# Patient Record
Sex: FEMALE | Race: WHITE | NOT HISPANIC OR LATINO
[De-identification: names, ages, dates, MRNs, and addresses within clinical notes are randomized per-mention and may not be internally consistent; named-entity substitution may affect disease eponyms.]

---

## 2017-03-09 ENCOUNTER — RESULT REVIEW (OUTPATIENT)
Age: 47
End: 2017-03-09

## 2018-04-11 ENCOUNTER — RESULT REVIEW (OUTPATIENT)
Age: 48
End: 2018-04-11

## 2020-04-25 ENCOUNTER — EMERGENCY (EMERGENCY)
Facility: HOSPITAL | Age: 50
LOS: 1 days | Discharge: HOME | End: 2020-04-25
Admitting: EMERGENCY MEDICINE
Payer: COMMERCIAL

## 2020-04-25 VITALS
TEMPERATURE: 98 F | HEART RATE: 91 BPM | RESPIRATION RATE: 17 BRPM | DIASTOLIC BLOOD PRESSURE: 77 MMHG | OXYGEN SATURATION: 98 % | SYSTOLIC BLOOD PRESSURE: 142 MMHG

## 2020-04-25 VITALS
WEIGHT: 214.95 LBS | HEIGHT: 63 IN | TEMPERATURE: 99 F | RESPIRATION RATE: 18 BRPM | DIASTOLIC BLOOD PRESSURE: 94 MMHG | HEART RATE: 107 BPM | SYSTOLIC BLOOD PRESSURE: 149 MMHG | OXYGEN SATURATION: 99 %

## 2020-04-25 LAB
ALBUMIN SERPL ELPH-MCNC: 3.7 G/DL — SIGNIFICANT CHANGE UP (ref 3.4–5)
ALP SERPL-CCNC: 81 U/L — SIGNIFICANT CHANGE UP (ref 40–120)
ALT FLD-CCNC: 25 U/L — SIGNIFICANT CHANGE UP (ref 12–42)
ANION GAP SERPL CALC-SCNC: 9 MMOL/L — SIGNIFICANT CHANGE UP (ref 9–16)
APPEARANCE UR: CLEAR — SIGNIFICANT CHANGE UP
AST SERPL-CCNC: 22 U/L — SIGNIFICANT CHANGE UP (ref 15–37)
BASOPHILS # BLD AUTO: 0.06 K/UL — SIGNIFICANT CHANGE UP (ref 0–0.2)
BASOPHILS NFR BLD AUTO: 0.4 % — SIGNIFICANT CHANGE UP (ref 0–2)
BILIRUB SERPL-MCNC: 1.9 MG/DL — HIGH (ref 0.2–1.2)
BILIRUB UR-MCNC: NEGATIVE — SIGNIFICANT CHANGE UP
BUN SERPL-MCNC: 9 MG/DL — SIGNIFICANT CHANGE UP (ref 7–23)
CALCIUM SERPL-MCNC: 9.3 MG/DL — SIGNIFICANT CHANGE UP (ref 8.5–10.5)
CHLORIDE SERPL-SCNC: 103 MMOL/L — SIGNIFICANT CHANGE UP (ref 96–108)
CO2 SERPL-SCNC: 25 MMOL/L — SIGNIFICANT CHANGE UP (ref 22–31)
COLOR SPEC: YELLOW — SIGNIFICANT CHANGE UP
CREAT SERPL-MCNC: 0.81 MG/DL — SIGNIFICANT CHANGE UP (ref 0.5–1.3)
DIFF PNL FLD: ABNORMAL
EOSINOPHIL # BLD AUTO: 0.02 K/UL — SIGNIFICANT CHANGE UP (ref 0–0.5)
EOSINOPHIL NFR BLD AUTO: 0.1 % — SIGNIFICANT CHANGE UP (ref 0–6)
GLUCOSE SERPL-MCNC: 128 MG/DL — HIGH (ref 70–99)
GLUCOSE UR QL: NEGATIVE — SIGNIFICANT CHANGE UP
HCT VFR BLD CALC: 40.1 % — SIGNIFICANT CHANGE UP (ref 34.5–45)
HGB BLD-MCNC: 13.8 G/DL — SIGNIFICANT CHANGE UP (ref 11.5–15.5)
IMM GRANULOCYTES NFR BLD AUTO: 0.6 % — SIGNIFICANT CHANGE UP (ref 0–1.5)
INR BLD: 1.17 — HIGH (ref 0.88–1.16)
KETONES UR-MCNC: NEGATIVE — SIGNIFICANT CHANGE UP
LEUKOCYTE ESTERASE UR-ACNC: NEGATIVE — SIGNIFICANT CHANGE UP
LIDOCAIN IGE QN: 54 U/L — LOW (ref 73–393)
LYMPHOCYTES # BLD AUTO: 1.53 K/UL — SIGNIFICANT CHANGE UP (ref 1–3.3)
LYMPHOCYTES # BLD AUTO: 9 % — LOW (ref 13–44)
MCHC RBC-ENTMCNC: 30.4 PG — SIGNIFICANT CHANGE UP (ref 27–34)
MCHC RBC-ENTMCNC: 34.4 GM/DL — SIGNIFICANT CHANGE UP (ref 32–36)
MCV RBC AUTO: 88.3 FL — SIGNIFICANT CHANGE UP (ref 80–100)
MONOCYTES # BLD AUTO: 0.74 K/UL — SIGNIFICANT CHANGE UP (ref 0–0.9)
MONOCYTES NFR BLD AUTO: 4.4 % — SIGNIFICANT CHANGE UP (ref 2–14)
NEUTROPHILS # BLD AUTO: 14.51 K/UL — HIGH (ref 1.8–7.4)
NEUTROPHILS NFR BLD AUTO: 85.5 % — HIGH (ref 43–77)
NITRITE UR-MCNC: NEGATIVE — SIGNIFICANT CHANGE UP
NRBC # BLD: 0 /100 WBCS — SIGNIFICANT CHANGE UP (ref 0–0)
PH UR: 6 — SIGNIFICANT CHANGE UP (ref 5–8)
PLATELET # BLD AUTO: 171 K/UL — SIGNIFICANT CHANGE UP (ref 150–400)
POTASSIUM SERPL-MCNC: 4 MMOL/L — SIGNIFICANT CHANGE UP (ref 3.5–5.3)
POTASSIUM SERPL-SCNC: 4 MMOL/L — SIGNIFICANT CHANGE UP (ref 3.5–5.3)
PROT SERPL-MCNC: 7.7 G/DL — SIGNIFICANT CHANGE UP (ref 6.4–8.2)
PROT UR-MCNC: NEGATIVE MG/DL — SIGNIFICANT CHANGE UP
PROTHROM AB SERPL-ACNC: 12.9 SEC — SIGNIFICANT CHANGE UP (ref 10–12.9)
RBC # BLD: 4.54 M/UL — SIGNIFICANT CHANGE UP (ref 3.8–5.2)
RBC # FLD: 13.6 % — SIGNIFICANT CHANGE UP (ref 10.3–14.5)
SODIUM SERPL-SCNC: 137 MMOL/L — SIGNIFICANT CHANGE UP (ref 132–145)
SP GR SPEC: <=1.005 — SIGNIFICANT CHANGE UP (ref 1–1.03)
UROBILINOGEN FLD QL: 0.2 E.U./DL — SIGNIFICANT CHANGE UP
WBC # BLD: 16.96 K/UL — HIGH (ref 3.8–10.5)
WBC # FLD AUTO: 16.96 K/UL — HIGH (ref 3.8–10.5)

## 2020-04-25 PROCEDURE — 74177 CT ABD & PELVIS W/CONTRAST: CPT | Mod: 26

## 2020-04-25 PROCEDURE — 99285 EMERGENCY DEPT VISIT HI MDM: CPT

## 2020-04-25 RX ORDER — CIPROFLOXACIN LACTATE 400MG/40ML
400 VIAL (ML) INTRAVENOUS ONCE
Refills: 0 | Status: COMPLETED | OUTPATIENT
Start: 2020-04-25 | End: 2020-04-25

## 2020-04-25 RX ORDER — MOXIFLOXACIN HYDROCHLORIDE TABLETS, 400 MG 400 MG/1
1 TABLET, FILM COATED ORAL
Qty: 20 | Refills: 0
Start: 2020-04-25 | End: 2020-05-04

## 2020-04-25 RX ORDER — IOHEXOL 300 MG/ML
30 INJECTION, SOLUTION INTRAVENOUS ONCE
Refills: 0 | Status: COMPLETED | OUTPATIENT
Start: 2020-04-25 | End: 2020-04-25

## 2020-04-25 RX ORDER — SODIUM CHLORIDE 9 MG/ML
1000 INJECTION INTRAMUSCULAR; INTRAVENOUS; SUBCUTANEOUS ONCE
Refills: 0 | Status: COMPLETED | OUTPATIENT
Start: 2020-04-25 | End: 2020-04-25

## 2020-04-25 RX ORDER — ACETAMINOPHEN 500 MG
975 TABLET ORAL ONCE
Refills: 0 | Status: COMPLETED | OUTPATIENT
Start: 2020-04-25 | End: 2020-04-25

## 2020-04-25 RX ORDER — METRONIDAZOLE 500 MG
500 TABLET ORAL ONCE
Refills: 0 | Status: COMPLETED | OUTPATIENT
Start: 2020-04-25 | End: 2020-04-25

## 2020-04-25 RX ORDER — METRONIDAZOLE 500 MG
1 TABLET ORAL
Qty: 21 | Refills: 0
Start: 2020-04-25 | End: 2020-05-01

## 2020-04-25 RX ADMIN — SODIUM CHLORIDE 1000 MILLILITER(S): 9 INJECTION INTRAMUSCULAR; INTRAVENOUS; SUBCUTANEOUS at 14:14

## 2020-04-25 RX ADMIN — Medication 100 MILLIGRAM(S): at 14:14

## 2020-04-25 RX ADMIN — SODIUM CHLORIDE 1000 MILLILITER(S): 9 INJECTION INTRAMUSCULAR; INTRAVENOUS; SUBCUTANEOUS at 11:30

## 2020-04-25 RX ADMIN — IOHEXOL 30 MILLILITER(S): 300 INJECTION, SOLUTION INTRAVENOUS at 11:30

## 2020-04-25 RX ADMIN — Medication 200 MILLIGRAM(S): at 15:06

## 2020-04-25 RX ADMIN — Medication 975 MILLIGRAM(S): at 11:30

## 2020-04-25 NOTE — ED PROVIDER NOTE - CLINICAL SUMMARY MEDICAL DECISION MAKING FREE TEXT BOX
Pt. with PMH HTN, migraines, c/o lower abd cramping pain X since yesterday morning. + large Soft BM ( NB) yesterday with some relief of pain. decrease appetite, + fever 100 took tylenol last 5 pm.  well appearing, hrt rate 107, temp 99, charisse do labs, Ct r/o acute abd etiology, pt. refused pain meds.

## 2020-04-25 NOTE — ED PROVIDER NOTE - NSFOLLOWUPINSTRUCTIONS_ED_ALL_ED_FT
Diverticulitis     Diverticulitis is when small pockets in your large intestine (colon) get infected or swollen. This causes stomach pain and watery poop (diarrhea).  These pouches are called diverticula. They form in people who have a condition called diverticulosis.  Follow these instructions at home:  Medicines     Take over-the-counter and prescription medicines only as told by your doctor. These include:  Antibiotics.Pain medicines.Fiber pills.Probiotics.Stool softeners.Do not drive or use heavy machinery while taking prescription pain medicine.If you were prescribed an antibiotic, take it as told. Do not stop taking it even if you feel better.General instructions        Follow a diet as told by your doctor.When you feel better, your doctor may tell you to change your diet. You may need to eat a lot of fiber. Fiber makes it easier to poop (have bowel movements). Healthy foods with fiber include:  Berries.Beans.Lentils.Green vegetables.Exercise 3 or more times a week. Aim for 30 minutes each time. Exercise enough to sweat and make your heart beat faster.Keep all follow-up visits as told. This is important. You may need to have an exam of the large intestine. This is called a colonoscopy.Contact a doctor if:  Your pain does not get better.You have a hard time eating or drinking.You are not pooping like normal.Get help right away if:  Your pain gets worse.Your problems do not get better.Your problems get worse very fast.You have a fever.You throw up (vomit) more than one time.You have poop that is:  Bloody.Black.Tarry.Summary  Diverticulitis is when small pockets in your large intestine (colon) get infected or swollen.Take medicines only as told by your doctor.Follow a diet as told by your doctor.This information is not intended to replace advice given to you by your health care provider. Make sure you discuss any questions you have with your health care provider.

## 2020-04-25 NOTE — ED PROVIDER NOTE - OBJECTIVE STATEMENT
Pt. with PMH HTN, migraines, c/o lower abd cramping pain X since yesterday morning. + large Soft BM ( NB) yesterday with some relief of pain. decrease appetite, + fever 100 took tylenol last 5 pm.  also c/o urinary urgency. pt. called PMD sent to ER for further evaluation. Pt. otherwise denies any N/v, no dysuria, no recent travel, no recent abx use, no sick contact. no known covid exposure.

## 2020-04-25 NOTE — ED PROVIDER NOTE - PATIENT PORTAL LINK FT
You can access the FollowMyHealth Patient Portal offered by [facility] by registering at the following website: [email or web address]. By joining [employer]’s FollowMyHealth portal, you will also be able to view your health information using other applications (apps) compatible with our system.

## 2020-04-25 NOTE — ED ADULT TRIAGE NOTE - CHIEF COMPLAINT QUOTE
Patient to ED with complaint of lower abdominal pain X 1 day with diarrhea and nausea.  Currently on menstrual cycle.  No acute distress

## 2020-04-25 NOTE — ED PROVIDER NOTE - GASTROINTESTINAL, MLM
mild diffuse suprapubic tenderness, otherwise Abdomen soft, no guarding, no rebound, no CVa tenderness, no RUQ pain

## 2020-04-29 DIAGNOSIS — R19.7 DIARRHEA, UNSPECIFIED: ICD-10-CM

## 2020-04-29 DIAGNOSIS — R50.9 FEVER, UNSPECIFIED: ICD-10-CM

## 2020-04-29 DIAGNOSIS — R10.30 LOWER ABDOMINAL PAIN, UNSPECIFIED: ICD-10-CM

## 2020-09-24 ENCOUNTER — EMERGENCY (EMERGENCY)
Facility: HOSPITAL | Age: 50
LOS: 1 days | Discharge: HOME | End: 2020-09-24
Attending: EMERGENCY MEDICINE | Admitting: EMERGENCY MEDICINE
Payer: COMMERCIAL

## 2020-09-24 VITALS
TEMPERATURE: 98 F | RESPIRATION RATE: 18 BRPM | OXYGEN SATURATION: 99 % | DIASTOLIC BLOOD PRESSURE: 83 MMHG | HEART RATE: 60 BPM | SYSTOLIC BLOOD PRESSURE: 165 MMHG

## 2020-09-24 VITALS
OXYGEN SATURATION: 97 % | HEART RATE: 70 BPM | WEIGHT: 220.02 LBS | DIASTOLIC BLOOD PRESSURE: 117 MMHG | SYSTOLIC BLOOD PRESSURE: 167 MMHG | RESPIRATION RATE: 15 BRPM | HEIGHT: 63 IN | TEMPERATURE: 98 F

## 2020-09-24 LAB
ALBUMIN SERPL ELPH-MCNC: 3.9 G/DL — SIGNIFICANT CHANGE UP (ref 3.4–5)
ALP SERPL-CCNC: 98 U/L — SIGNIFICANT CHANGE UP (ref 40–120)
ALT FLD-CCNC: 31 U/L — SIGNIFICANT CHANGE UP (ref 12–42)
ANION GAP SERPL CALC-SCNC: 9 MMOL/L — SIGNIFICANT CHANGE UP (ref 9–16)
APPEARANCE UR: CLEAR — SIGNIFICANT CHANGE UP
APTT BLD: 31.5 SEC — SIGNIFICANT CHANGE UP (ref 27.5–35.5)
AST SERPL-CCNC: 21 U/L — SIGNIFICANT CHANGE UP (ref 15–37)
BACTERIA # UR AUTO: PRESENT /HPF
BASOPHILS # BLD AUTO: 0.06 K/UL — SIGNIFICANT CHANGE UP (ref 0–0.2)
BASOPHILS NFR BLD AUTO: 0.6 % — SIGNIFICANT CHANGE UP (ref 0–2)
BILIRUB SERPL-MCNC: 0.5 MG/DL — SIGNIFICANT CHANGE UP (ref 0.2–1.2)
BILIRUB UR-MCNC: NEGATIVE — SIGNIFICANT CHANGE UP
BUN SERPL-MCNC: 15 MG/DL — SIGNIFICANT CHANGE UP (ref 7–23)
CALCIUM SERPL-MCNC: 9.3 MG/DL — SIGNIFICANT CHANGE UP (ref 8.5–10.5)
CHLORIDE SERPL-SCNC: 104 MMOL/L — SIGNIFICANT CHANGE UP (ref 96–108)
CO2 SERPL-SCNC: 26 MMOL/L — SIGNIFICANT CHANGE UP (ref 22–31)
COLOR SPEC: YELLOW — SIGNIFICANT CHANGE UP
CREAT SERPL-MCNC: 0.75 MG/DL — SIGNIFICANT CHANGE UP (ref 0.5–1.3)
DIFF PNL FLD: ABNORMAL
EOSINOPHIL # BLD AUTO: 0.2 K/UL — SIGNIFICANT CHANGE UP (ref 0–0.5)
EOSINOPHIL NFR BLD AUTO: 2 % — SIGNIFICANT CHANGE UP (ref 0–6)
EPI CELLS # UR: SIGNIFICANT CHANGE UP /HPF (ref 0–5)
GLUCOSE SERPL-MCNC: 110 MG/DL — HIGH (ref 70–99)
GLUCOSE UR QL: NEGATIVE — SIGNIFICANT CHANGE UP
HCT VFR BLD CALC: 40.5 % — SIGNIFICANT CHANGE UP (ref 34.5–45)
HGB BLD-MCNC: 13.8 G/DL — SIGNIFICANT CHANGE UP (ref 11.5–15.5)
IMM GRANULOCYTES NFR BLD AUTO: 0.4 % — SIGNIFICANT CHANGE UP (ref 0–1.5)
INR BLD: 0.88 — SIGNIFICANT CHANGE UP (ref 0.88–1.16)
KETONES UR-MCNC: NEGATIVE — SIGNIFICANT CHANGE UP
LEUKOCYTE ESTERASE UR-ACNC: NEGATIVE — SIGNIFICANT CHANGE UP
LYMPHOCYTES # BLD AUTO: 2.85 K/UL — SIGNIFICANT CHANGE UP (ref 1–3.3)
LYMPHOCYTES # BLD AUTO: 28.3 % — SIGNIFICANT CHANGE UP (ref 13–44)
MCHC RBC-ENTMCNC: 30.4 PG — SIGNIFICANT CHANGE UP (ref 27–34)
MCHC RBC-ENTMCNC: 34.1 GM/DL — SIGNIFICANT CHANGE UP (ref 32–36)
MCV RBC AUTO: 89.2 FL — SIGNIFICANT CHANGE UP (ref 80–100)
MONOCYTES # BLD AUTO: 0.51 K/UL — SIGNIFICANT CHANGE UP (ref 0–0.9)
MONOCYTES NFR BLD AUTO: 5.1 % — SIGNIFICANT CHANGE UP (ref 2–14)
NEUTROPHILS # BLD AUTO: 6.4 K/UL — SIGNIFICANT CHANGE UP (ref 1.8–7.4)
NEUTROPHILS NFR BLD AUTO: 63.6 % — SIGNIFICANT CHANGE UP (ref 43–77)
NITRITE UR-MCNC: NEGATIVE — SIGNIFICANT CHANGE UP
NRBC # BLD: 0 /100 WBCS — SIGNIFICANT CHANGE UP (ref 0–0)
PH UR: 5 — SIGNIFICANT CHANGE UP (ref 5–8)
PLATELET # BLD AUTO: 178 K/UL — SIGNIFICANT CHANGE UP (ref 150–400)
POTASSIUM SERPL-MCNC: 4.4 MMOL/L — SIGNIFICANT CHANGE UP (ref 3.5–5.3)
POTASSIUM SERPL-SCNC: 4.4 MMOL/L — SIGNIFICANT CHANGE UP (ref 3.5–5.3)
PROT SERPL-MCNC: 7.7 G/DL — SIGNIFICANT CHANGE UP (ref 6.4–8.2)
PROT UR-MCNC: NEGATIVE MG/DL — SIGNIFICANT CHANGE UP
PROTHROM AB SERPL-ACNC: 10.6 SEC — SIGNIFICANT CHANGE UP (ref 10.6–13.6)
RBC # BLD: 4.54 M/UL — SIGNIFICANT CHANGE UP (ref 3.8–5.2)
RBC # FLD: 12.7 % — SIGNIFICANT CHANGE UP (ref 10.3–14.5)
RBC CASTS # UR COMP ASSIST: < 5 /HPF — SIGNIFICANT CHANGE UP
SODIUM SERPL-SCNC: 139 MMOL/L — SIGNIFICANT CHANGE UP (ref 132–145)
SP GR SPEC: <=1.005 — SIGNIFICANT CHANGE UP (ref 1–1.03)
TROPONIN I SERPL-MCNC: <0.017 NG/ML — LOW (ref 0.02–0.06)
UROBILINOGEN FLD QL: 0.2 E.U./DL — SIGNIFICANT CHANGE UP
WBC # BLD: 10.06 K/UL — SIGNIFICANT CHANGE UP (ref 3.8–10.5)
WBC # FLD AUTO: 10.06 K/UL — SIGNIFICANT CHANGE UP (ref 3.8–10.5)
WBC UR QL: < 5 /HPF — SIGNIFICANT CHANGE UP

## 2020-09-24 PROCEDURE — 93010 ELECTROCARDIOGRAM REPORT: CPT | Mod: NC

## 2020-09-24 PROCEDURE — 70450 CT HEAD/BRAIN W/O DYE: CPT | Mod: 26,59

## 2020-09-24 PROCEDURE — 99285 EMERGENCY DEPT VISIT HI MDM: CPT

## 2020-09-24 PROCEDURE — 0042T: CPT

## 2020-09-24 PROCEDURE — 70498 CT ANGIOGRAPHY NECK: CPT | Mod: 26

## 2020-09-24 PROCEDURE — 70496 CT ANGIOGRAPHY HEAD: CPT | Mod: 26

## 2020-09-24 RX ORDER — HYDROCHLOROTHIAZIDE 25 MG
25 TABLET ORAL ONCE
Refills: 0 | Status: COMPLETED | OUTPATIENT
Start: 2020-09-24 | End: 2020-09-24

## 2020-09-24 RX ADMIN — Medication 25 MILLIGRAM(S): at 21:44

## 2020-09-24 NOTE — ED PROVIDER NOTE - PATIENT PORTAL LINK FT
You can access the FollowMyHealth Patient Portal offered by [facility] by registering at the following website: [email or web address]. By joining [employer] [facility]’s FollowMyHealth portal, you will also be able to view your health information using other applications (apps) compatible with our system.

## 2020-09-24 NOTE — ED PROVIDER NOTE - CLINICAL SUMMARY MEDICAL DECISION MAKING FREE TEXT BOX
49 y/o F presents to ED c/o R sided tongue and lip numbness.  Pt well appearing.  Stroke protocol initiated upon arrival and later canceled.  Blood pressure is elevated.  Pts BP improved without intervention and symptoms improved.  Pt given dose of HCTZ 25 mg and advised to f/u with PCP to discuss BP management.  Results and diagnosis discussed with patient.  Treatment plan discussed.  Return precautions discussed.  Pt verbalized understanding and given the opportunity to ask questions.  Patient advised to follow up with primary care provider.

## 2020-09-24 NOTE — ED PROVIDER NOTE - NSFOLLOWUPINSTRUCTIONS_ED_ALL_ED_FT
Hypertension    Hypertension, commonly called high blood pressure, is when the force of blood pumping through your arteries is too strong. Hypertension forces your heart to work harder to pump blood. Your arteries may become narrow or stiff. Having untreated or uncontrolled hypertension for a long period of time can cause heart attack, stroke, kidney disease, and other problems. If started on a medication, take exactly as prescribed by your health care professional. Maintain a healthy lifestyle and follow up with your primary care physician.    SEEK IMMEDIATE MEDICAL CARE IF YOU HAVE ANY OF THE FOLLOWING SYMPTOMS: severe headache, confusion, chest pain, abdominal pain, vomiting, or shortness of breath.    FOLLOW UP WITH PRIMARY CARE PROVIDER IN 1-2 DAYS.  CONTINUE TAKING MEDICATIONS AS PRESCRIBED.

## 2020-09-24 NOTE — ED ADULT NURSE REASSESSMENT NOTE - NS ED NURSE REASSESS COMMENT FT1
Assuming responsibility of care from LORENE Robertson, patient returned from CT scan, code stroke cancelled. Patient has a GCS of 15, NIH scale of 0. Patient reports facial numbness is improving. In NAD, hemodynamically stable, on the cardiac monitor, resting in bed, in a cheerful mood. Will continue to monitor.

## 2020-09-24 NOTE — ED ADULT NURSE NOTE - DOES PATIENT HAVE ADVANCE DIRECTIVE
No Hide Include Location In Plan Question?: No Detail Level: Zone Include Location In Plan?: Yes Detail Level: Simple

## 2020-09-24 NOTE — ED PROVIDER NOTE - OBJECTIVE STATEMENT
49 y/o F with PMH of HTN on bystolic, atenolol and other unknown medication.  presents to ED via walkin with c/o R tongue and lip numbness that started 2 hours pta. 51 y/o F with PMH of HTN on Bystolic and metoprolol and other unknown medication.  presents to ED via walkin with c/o R tongue and lip numbness that started 2 hours pta.  Pt states she has had multiple changes to her BP medications most recently due to adverse effects (cough with lisinopril and different medication causing a choking sensation).  She took her BP medications at 2 pm today.    Pt denies trauma/falls, fevers/chills, neck or back pain, headache, visual changes, sore throat, chest pain, palpitations, cough, SOB, abd pain, n/v/d, dysuria, hematuria, weakness, dizziness, lower extremity swelling, rash, sick contacts, recent hospitalizations, recent travels.

## 2020-09-28 DIAGNOSIS — I10 ESSENTIAL (PRIMARY) HYPERTENSION: ICD-10-CM

## 2020-09-28 DIAGNOSIS — R20.0 ANESTHESIA OF SKIN: ICD-10-CM

## 2021-01-02 ENCOUNTER — EMERGENCY (EMERGENCY)
Facility: HOSPITAL | Age: 51
LOS: 1 days | Discharge: HOME | End: 2021-01-02
Attending: EMERGENCY MEDICINE | Admitting: EMERGENCY MEDICINE
Payer: COMMERCIAL

## 2021-01-02 VITALS
SYSTOLIC BLOOD PRESSURE: 145 MMHG | DIASTOLIC BLOOD PRESSURE: 88 MMHG | RESPIRATION RATE: 16 BRPM | HEIGHT: 63 IN | TEMPERATURE: 99 F | HEART RATE: 80 BPM | OXYGEN SATURATION: 97 %

## 2021-01-02 DIAGNOSIS — I10 ESSENTIAL (PRIMARY) HYPERTENSION: ICD-10-CM

## 2021-01-02 DIAGNOSIS — Z20.822 CONTACT WITH AND (SUSPECTED) EXPOSURE TO COVID-19: ICD-10-CM

## 2021-01-02 DIAGNOSIS — Z79.2 LONG TERM (CURRENT) USE OF ANTIBIOTICS: ICD-10-CM

## 2021-01-02 LAB — S PYO AG SPEC QL IA: NEGATIVE — SIGNIFICANT CHANGE UP

## 2021-01-02 PROCEDURE — 99283 EMERGENCY DEPT VISIT LOW MDM: CPT

## 2021-01-02 NOTE — ED PROVIDER NOTE - PATIENT PORTAL LINK FT
You can access the FollowMyHealth Patient Portal offered by [facility] by registering at the following website: [email or web address]. By joining [employer]’s FollowMyHealth portal, you will also be able to view your health information using other applications (apps) compatible with our system.

## 2021-01-02 NOTE — ED PROVIDER NOTE - OBJECTIVE STATEMENT
Patient presented requesting covid-19 testing. Patient asymptomatic - denies chest pain, dyspnea, fever, cough. denies recent travel or known exposure to covid-19 Patient presented requesting covid-19 testing. Patient has a mild sore throat and hx frequent strep throat. denies chest pain, dyspnea, fever, cough. denies recent travel or known exposure to covid-19

## 2021-01-02 NOTE — ED ADULT TRIAGE NOTE - CHIEF COMPLAINT QUOTE
Pt presents requesting asymptomatic COVID swab.  Pt endorses sore throat.  Pt denies cough/fever/chills/SOB.  Treat and release.

## 2021-01-03 PROBLEM — I10 ESSENTIAL (PRIMARY) HYPERTENSION: Chronic | Status: ACTIVE | Noted: 2020-09-24

## 2021-01-05 LAB
CULTURE RESULTS: SIGNIFICANT CHANGE UP
SPECIMEN SOURCE: SIGNIFICANT CHANGE UP

## 2021-01-19 ENCOUNTER — EMERGENCY (EMERGENCY)
Facility: HOSPITAL | Age: 51
LOS: 1 days | Discharge: HOME | End: 2021-01-19
Attending: EMERGENCY MEDICINE | Admitting: EMERGENCY MEDICINE
Payer: COMMERCIAL

## 2021-01-19 VITALS
SYSTOLIC BLOOD PRESSURE: 135 MMHG | DIASTOLIC BLOOD PRESSURE: 80 MMHG | TEMPERATURE: 98 F | OXYGEN SATURATION: 98 % | HEART RATE: 80 BPM | RESPIRATION RATE: 18 BRPM | HEIGHT: 63 IN

## 2021-01-19 DIAGNOSIS — Z20.822 CONTACT WITH AND (SUSPECTED) EXPOSURE TO COVID-19: ICD-10-CM

## 2021-01-19 PROCEDURE — 99283 EMERGENCY DEPT VISIT LOW MDM: CPT

## 2021-01-19 NOTE — ED PROVIDER NOTE - PATIENT PORTAL LINK FT
You can access the FollowMyHealth Patient Portal offered by [facility] by registering at the following website: [email or web address]. By joining [employer]’s FollowMyHealth portal, you will also be able to view your health information using other applications (apps) compatible with our system.

## 2021-01-19 NOTE — ED PROVIDER NOTE - CCCP TRG CHIEF CMPLNT
[address] [id number] [phone] Patient: Silvana Driver MRN: [id number]:[id number] Visit Information Date & Time Provider Department Dept. Phone Encounter #  
 3/2/2018  3:30 PM Josue Perez, [address] [place] [address] [phone] [id number] Upcoming Health Maintenance Date Due DTaP/Tdap/Td series (2 - Td) 2/13/2028 Allergies as of 3/2/2018  Review Complete On: 3/2/2018 By: Taylor Pan No Known Allergies Current Immunizations  Never Reviewed Name Date Influenza Vaccine (Quad) PF 2/13/2018 Pneumococcal Polysaccharide (PPSV-23) 2/13/2018 Tdap 2/13/2018 Not reviewed this visit You Were Diagnosed With   
  
 Codes Comments Pain of right lower extremity    -  Primary ICD-10-CM: M79.604 ICD-9-CM: 729.5 Neuropathic pain of lower extremity, right     ICD-10-CM: G57.91 
ICD-9-CM: 355.8 Vitals BP Pulse Temp Resp Height(growth percentile) Weight(growth percentile) 136/84 (BP 1 Location: Right arm, BP Patient Position: Sitting) 99 98.5 °F (36.9 °C) (Oral) 28 5' 10\" (1.778 m) 241 lb (109.3 kg) SpO2 BMI Smoking Status 99% 34.58 kg/m2 Current Every Day Smoker Vitals History BMI and BSA Data Body Mass Index Body Surface Area 34.58 kg/m 2 2.32 m 2 Preferred Pharmacy Pharmacy Name Phone [facility] [address] [phone] Your Updated Medication List  
  
   
This list is accurate as of 3/2/18  3:51 PM.  Always use your most recent med list.  
  
  
  
  
 predniSONE 10 mg tablet Commonly known as:  [place] Take 1 Tab by mouth daily (with breakfast). * QUEtiapine 50 mg tablet Commonly known as:  SEROquel Take 1 Tab by mouth three (3) times daily. * QUEtiapine 100 mg tablet Commonly known as:  SEROquel Take 1 Tab by mouth nightly. * Notice: This list has 2 medication(s) that are the same as other medications prescribed for you. Read the directions carefully, and ask your doctor or other care provider to review them with you. Prescriptions Sent to Pharmacy Refills  
 predniSONE (DELTASONE) 10 mg tablet 0 Sig: Take 1 Tab by mouth daily (with breakfast). Class: Normal  
 Pharmacy: [place] [address], [address] #: [phone] Route: Oral  
  
Introducing [facility]! [facility] introduces [email or web address] patient portal. Now you can access parts of your medical record, email your doctor's office, and request medication refills online. 1. In your internet browser, go to [email or web address] 2. Click on the First Time User? Click Here link in the Sign In box. You will see the New Member Sign Up page. 3. Enter your [email or web address] Access Code exactly as it appears below. You will not need to use this code after youve completed the sign-up process. If you do not sign up before the expiration date, you must request a new code. · [email or web address] Access Code: [email or web address] Expires: 5/14/2018 10:46 AM 
 
4. Enter the last four digits of your Social Security Number (xxxx) and Date of Birth (mm/dd/yyyy) as indicated and click Submit. You will be taken to the next sign-up page. 5. Create a [email or web address] ID. This will be your [employer] login ID and cannot be changed, so think of one that is secure and easy to remember. 6. Create a [email or web address] password. You can change your password at any time. 7. Enter your Password Reset Question and Answer. This can be used at a later time if you forget your password. 8. Enter your e-mail address. You will receive e-mail notification when new information is available in [address] 9. Click Sign Up. You can now view and download portions of your medical record. 10. Click the Download Summary menu link to download a portable copy of your medical information. If you have questions, please visit the Frequently Asked Questions section of the UserEvents website. Remember, UserEvents is NOT to be used for urgent needs. For medical emergencies, dial 911. Now available from your iPhone and Android! Please provide this summary of care documentation to your next provider. Your primary care clinician is listed as Lindaamando Srivastava. If you have any questions after today's visit, please call [phone]. medical evaluation

## 2021-01-19 NOTE — ED PROVIDER NOTE - NSFOLLOWUPINSTRUCTIONS_ED_ALL_ED_FT
IF YOU DO NOT GET YOUR RESULTS WITHIN 48 HOURS PLEASE CALL [phone].    IF YOUR RESULT COMES BACK POSITIVE:    1. STAY HOME for 14 DAYS  2. Minimize Human contact to ONLY ESSENTIAL  3. Every time you wash your hands, sing the HAPPY BIRTHDAY Song so you know you're washing long enough.  Make sure to scrub the webspace between your fingers.  4. DRINK 1-3 Liters of fluids day x at least 5 days.  To remain hydrated. Your fatigue, lightheadedness, and body aches will decrease and your fever has a better chance of breaking if you are well hydrated.    5. For your Fever and Body aches takes Tylenol 650-100mg every 4-6h (max 4000mg/day). Try not to use ibuprofen, aspirin or naproxen (Advil, Motrin or Aleve) as these may worsen Coronavirus infection.  6. RETURN TO THE ER IMMEDIATELY IF YOU HAVE WORSENING SHORTNESS OF BREATH. SYMPTOMS USUALLY PEAK BETWEEN DAY 7-10.

## 2021-01-30 ENCOUNTER — EMERGENCY (EMERGENCY)
Facility: HOSPITAL | Age: 51
LOS: 1 days | Discharge: HOME | End: 2021-01-30
Attending: EMERGENCY MEDICINE | Admitting: EMERGENCY MEDICINE
Payer: COMMERCIAL

## 2021-01-30 VITALS
HEART RATE: 61 BPM | TEMPERATURE: 98 F | HEIGHT: 63 IN | SYSTOLIC BLOOD PRESSURE: 139 MMHG | OXYGEN SATURATION: 96 % | DIASTOLIC BLOOD PRESSURE: 80 MMHG | RESPIRATION RATE: 18 BRPM

## 2021-01-30 DIAGNOSIS — Z20.822 CONTACT WITH AND (SUSPECTED) EXPOSURE TO COVID-19: ICD-10-CM

## 2021-01-30 DIAGNOSIS — Z79.899 OTHER LONG TERM (CURRENT) DRUG THERAPY: ICD-10-CM

## 2021-01-30 DIAGNOSIS — Z79.2 LONG TERM (CURRENT) USE OF ANTIBIOTICS: ICD-10-CM

## 2021-01-30 PROCEDURE — 99283 EMERGENCY DEPT VISIT LOW MDM: CPT

## 2021-01-30 NOTE — ED PROVIDER NOTE - NSFOLLOWUPINSTRUCTIONS_ED_ALL_ED_FT
Your test results may take 1-3 days. You will get a text/email.  Please check the patient online portal ([employer] and website) for results. You can create a portal account at [email or web address] [facility] [place]. If you have old records with [facility] or [facility]  or encounter any difficulties with us you will need to call the HELP line to merge results [phone] (Mon-Fri 8a-5p).    Please follow the instructions on provided coronavirus discharge educational forms and if needed self quarantine for 14 days.     If you test positive for COVID 19:    1. STAY HOME for 14 DAYS  2. Minimize human contact to ONLY ESSENTIAL  3. Every time you wash your hands, sing the HAPPY BIRTHDAY song so you know you're washing long enough.  Make sure to scrub the webspace between your fingers.  4. DRINK 1-3 Liters of fluids day x at least 5 days.  To remain hydrated. Your fatigue, lightheadedness, and body aches will decrease and your fever has a better chance of breaking if you are well hydrated.    5. For your Fever and Body aches takes Tylenol 650-100mg every 4-6h (max 4000mg/day). Try not to use ibuprofen, aspirin or naproxen (Advil, Motrin or Aleve) as these may worsen Coronavirus infection.  6. RETURN TO THE ER IMMEDIATELY IF YOU HAVE WORSENING SHORTNESS OF BREATH  7. TAKE THE FOLLOWING SUPPLEMENTS DAILY.        VITAMIN C 1000MG ONCE DAILY.        VITAMIN D 200IU ONCE DAILY.        ZINC 50MG ONCE DAILY.

## 2021-01-30 NOTE — ED PROVIDER NOTE - PATIENT PORTAL LINK FT
You can access the FollowMyHealth Patient Portal offered by [facility] by registering at the following website: [email or web address]. By joining [employer]’s FollowMyHealth portal, you will also be able to view your health information using other applications (apps) compatible with our system.

## 2021-01-31 LAB — SARS-COV-2 RNA SPEC QL NAA+PROBE: SIGNIFICANT CHANGE UP

## 2021-02-19 ENCOUNTER — EMERGENCY (EMERGENCY)
Facility: HOSPITAL | Age: 51
LOS: 1 days | Discharge: HOME | End: 2021-02-19
Admitting: EMERGENCY MEDICINE
Payer: COMMERCIAL

## 2021-02-19 VITALS
RESPIRATION RATE: 18 BRPM | HEIGHT: 63 IN | OXYGEN SATURATION: 97 % | DIASTOLIC BLOOD PRESSURE: 71 MMHG | TEMPERATURE: 99 F | SYSTOLIC BLOOD PRESSURE: 165 MMHG | HEART RATE: 53 BPM

## 2021-02-19 DIAGNOSIS — Z79.899 OTHER LONG TERM (CURRENT) DRUG THERAPY: ICD-10-CM

## 2021-02-19 DIAGNOSIS — Z20.822 CONTACT WITH AND (SUSPECTED) EXPOSURE TO COVID-19: ICD-10-CM

## 2021-02-19 DIAGNOSIS — Z79.2 LONG TERM (CURRENT) USE OF ANTIBIOTICS: ICD-10-CM

## 2021-02-19 PROCEDURE — 99282 EMERGENCY DEPT VISIT SF MDM: CPT

## 2021-02-19 NOTE — ED PROVIDER NOTE - OBJECTIVE STATEMENT
49 y/o female presents to the ED requesting COVID-19 testing. Patient is currently asymptomatic and has no other medical complaints at this time. Denies fever, chills, chest pain, SOB, cough.

## 2021-02-19 NOTE — ED PROVIDER NOTE - NSFOLLOWUPINSTRUCTIONS_ED_ALL_ED_FT
THE COVID 19 TEST RESULTS  - results may take up to 2-3 days to become available   - if you have consented, you will receive your results electronically   -  you can check [facility]UL[facility] or call [phone] to discuss your results with our nursing staff    Please continue to self quarantine (home isolation) until your result is back and follow instructions accordingly  - positive: complete home isolation for a total of 14 days since day of testing and no more fever with feeling back to baseline   - negative: you will be able to stop home isolation but still practice standard precautions, similar to how you would manage a regular flu/cold.    Return to ER for any worsening symptoms, such as persistent fever >100.4F, shortness of breath, coughing up bloody sputum, chest pain, lethargy, and fainting    Please remember to wash your hands frequently (>20 seconds each time), avoid touching your face, and cover your cough/sneeze.  Always wear a mask when you are outside of your home and practice social distancing.    Only take Tylenol for fever/pain control and avoid NSAIDs (ibuprofen/Advil/Aleve/naproxen) due to potential increased risk of exacerbating COVID-19 infection

## 2021-02-19 NOTE — ED PROVIDER NOTE - PATIENT PORTAL LINK FT
You can access the FollowMyHealth Patient Portal offered by [facility] by registering at the following website: [email or web address]. By joining [employer]’s FollowMyHealth portal, you will also be able to view your health information using other applications (apps) compatible with our system.

## 2021-02-20 LAB — SARS-COV-2 RNA SPEC QL NAA+PROBE: SIGNIFICANT CHANGE UP

## 2021-02-22 ENCOUNTER — EMERGENCY (EMERGENCY)
Facility: HOSPITAL | Age: 51
LOS: 1 days | Discharge: HOME | End: 2021-02-22
Admitting: EMERGENCY MEDICINE
Payer: COMMERCIAL

## 2021-02-22 VITALS
SYSTOLIC BLOOD PRESSURE: 154 MMHG | DIASTOLIC BLOOD PRESSURE: 94 MMHG | HEIGHT: 63 IN | OXYGEN SATURATION: 98 % | TEMPERATURE: 97 F | RESPIRATION RATE: 18 BRPM | HEART RATE: 61 BPM

## 2021-02-22 DIAGNOSIS — Z20.822 CONTACT WITH AND (SUSPECTED) EXPOSURE TO COVID-19: ICD-10-CM

## 2021-02-22 PROCEDURE — 99282 EMERGENCY DEPT VISIT SF MDM: CPT

## 2021-02-22 NOTE — ED PROVIDER NOTE - PATIENT PORTAL LINK FT
You can access the FollowMyHealth Patient Portal offered by [facility] by registering at the following website: [email or web address]. By joining [employer]’s FollowMyHealth portal, you will also be able to view your health information using other applications (apps) compatible with our system.

## 2021-02-22 NOTE — ED PROVIDER NOTE - NSFOLLOWUPINSTRUCTIONS_ED_ALL_ED_FT
THE COVID 19 TEST RESULTS  - results may take up to 2-3 days to become available   - if you have consented, you will receive your results electronically   -  you can check [employer] [facility]UL[facility] or call [phone] to discuss your results with our nursing staff    Please continue to self quarantine (home isolation) until your result is back and follow instructions accordingly  - positive: complete home isolation for a total of 14 days since day of testing and no more fever with feeling back to baseline   - negative: you will be able to stop home isolation but still practice standard precautions, similar to how you would manage a regular flu/cold.    Return to ER for any worsening symptoms, such as persistent fever >100.4F, shortness of breath, coughing up bloody sputum, chest pain, lethargy, and fainting    Please remember to wash your hands frequently (>20 seconds each time), avoid touching your face, and cover your cough/sneeze.  Always wear a mask when you are outside of your home and practice social distancing.    Only take Tylenol for fever/pain control and avoid NSAIDs (ibuprofen/Advil/Aleve/naproxen) due to potential increased risk of exacerbating COVID-19 infection

## 2021-02-23 LAB — SARS-COV-2 RNA SPEC QL NAA+PROBE: SIGNIFICANT CHANGE UP

## 2021-04-13 ENCOUNTER — APPOINTMENT (OUTPATIENT)
Dept: SURGERY | Facility: CLINIC | Age: 51
End: 2021-04-13
Payer: COMMERCIAL

## 2021-04-13 VITALS
WEIGHT: 225 LBS | DIASTOLIC BLOOD PRESSURE: 83 MMHG | BODY MASS INDEX: 39.87 KG/M2 | OXYGEN SATURATION: 98 % | HEIGHT: 63 IN | TEMPERATURE: 98 F | HEART RATE: 68 BPM | SYSTOLIC BLOOD PRESSURE: 120 MMHG

## 2021-04-13 DIAGNOSIS — Z86.39 PERSONAL HISTORY OF OTHER ENDOCRINE, NUTRITIONAL AND METABOLIC DISEASE: ICD-10-CM

## 2021-04-13 DIAGNOSIS — R10.9 UNSPECIFIED ABDOMINAL PAIN: ICD-10-CM

## 2021-04-13 DIAGNOSIS — Z87.19 PERSONAL HISTORY OF OTHER DISEASES OF THE DIGESTIVE SYSTEM: ICD-10-CM

## 2021-04-13 DIAGNOSIS — Z86.79 PERSONAL HISTORY OF OTHER DISEASES OF THE CIRCULATORY SYSTEM: ICD-10-CM

## 2021-04-13 DIAGNOSIS — Z78.9 OTHER SPECIFIED HEALTH STATUS: ICD-10-CM

## 2021-04-13 DIAGNOSIS — Z82.49 FAMILY HISTORY OF ISCHEMIC HEART DISEASE AND OTHER DISEASES OF THE CIRCULATORY SYSTEM: ICD-10-CM

## 2021-04-13 DIAGNOSIS — Z87.891 PERSONAL HISTORY OF NICOTINE DEPENDENCE: ICD-10-CM

## 2021-04-13 DIAGNOSIS — K21.9 GASTRO-ESOPHAGEAL REFLUX DISEASE W/OUT ESOPHAGITIS: ICD-10-CM

## 2021-04-13 PROCEDURE — 99244 OFF/OP CNSLTJ NEW/EST MOD 40: CPT

## 2021-04-13 PROCEDURE — 99072 ADDL SUPL MATRL&STAF TM PHE: CPT

## 2021-04-13 RX ORDER — METOPROLOL TARTRATE 25 MG/1
25 TABLET, FILM COATED ORAL
Refills: 0 | Status: ACTIVE | COMMUNITY

## 2021-04-13 RX ORDER — NEBIVOLOL HYDROCHLORIDE 10 MG/1
10 TABLET ORAL
Refills: 0 | Status: ACTIVE | COMMUNITY

## 2021-04-13 RX ORDER — ALPRAZOLAM 0.5 MG/1
0.5 TABLET ORAL
Refills: 0 | Status: ACTIVE | COMMUNITY

## 2021-04-13 NOTE — HISTORY OF PRESENT ILLNESS
[de-identified] : Ms. Tolbert presented today for evaluation and management of epigastric pain.  She stated the pain has been present intermittently for 2 months.  The pain radiates into the right and left upper abdomen.  She stated the pain occurs at night, but is not associated with food.  She denied associated fever, chills, nausea, vomiting, diarrhea, or constipation.  She has been having "worse" heartburn symptoms recently which have not been controlled by Pepcid, which has previously worked to control her symptoms.  She has never had an endoscopy.

## 2021-04-13 NOTE — CONSULT LETTER
[FreeTextEntry1] : 2021\par \par \par \par Yosef Munoz M.D.[facility], .\par [address]\[place] [address]\[facility] Telephone #: [phone]\par \par \par Re: Danay Tolbert\par : 1970\[facility] \[facility] \[facility] Dear Dr. Munoz:\[facility] \[facility] I had the opportunity to see Ms. Tolbert today for evaluation and management of epigastric pain.  She stated the pain has been present intermittently for 2 months.  The pain radiates into the right and left upper abdomen.  She stated the pain occurs at night, but is not associated with food.  She denied associated fever, chills, nausea, vomiting, diarrhea, or constipation.  She has been having "worse" heartburn symptoms recently which have not been controlled by Pepcid, which has previously worked to control her symptoms.  She has never had an endoscopy.\[facility] \[facility] On physical examination, her height is 5 feet 3 inches, weight is 225 pounds, and BMI 39.86.  Her temperature is 98 °F, blood pressure is 120/83, heart rate is 68, and O2 saturation is 98% on room air.  In general, she is a well-dressed, well-nourished woman who appears her stated age and is in no acute distress.  She is calm, alert and oriented x 3.  HEENT exam demonstrates a normocephalic atraumatic appearance with no scleral icterus.  Her neck is supple without JVD.  There is no cervical lymphadenopathy.  Her lungs are clear to auscultation bilaterally.  Heart sounds S1 S2 are normal with a regular rate and rhythm.  Her abdomen has audible bowel sounds, is soft, non-tender, and non-distended. There is no hepatosplenomegaly appreciated.  Her extremities are warm and dry without clubbing, cyanosis or edema. \[facility] \[facility] I reviewed the images and report of the CT abdomen/pelvis that was performed on 2020, which demonstrated a small fat-containing umbilical hernia.  Acute sigmoid diverticulitis. No free air or pericolonic fluid collection.  Enlarged fibroid uterus.  Hepatic steatosis.\[facility] \[facility] I reviewed the ultrasound of the abdomen that was performed on 2021, which demonstrated a fatty liver.  Possible gallstone.  Borderline enlarged spleen.  Fibroid uterus.  Obscured endometrium and ovaries. Transvaginal pelvic ultrasound is recommended as clinically warranted.\par \par In summary, Ms. Tolbert is a 50-year-old woman with abdominal pain with unclear etiology.  As she has worsening GERD symptoms that are not well controlled with medication currently and has not had an upper or lower endoscopy, she was referred to gastroenterology for EGD and screening colonoscopy.  If the EGD is negative, we will consider a repeat ultrasound of the abdomen to determine whether she has cholelithiasis, and potentially an MRI versus a HIDA with CCK.\par \par Thank you for the opportunity to care for this patient.  Please do not hesitate to contact me in the event that you have any questions or concerns regarding the care of this patient. \par \par Sincerely,\par \par \par \par \par Flor Mcdowell M.D.

## 2021-04-13 NOTE — PHYSICAL EXAM
[Calm] : calm [de-identified] : NAD, comfortable [de-identified] : NCAT, no scleral icterus [de-identified] : Supple, no JVD or cervical lymphadenopathy [de-identified] : CTAB [de-identified] : S1S2 normal, RRR [de-identified] : +BS soft NT ND.  No hepatosplenomegaly. [de-identified] : No clubbing, cyanosis, or edema. [de-identified] : Warm, dry. [de-identified] : A&Ox3

## 2021-04-13 NOTE — REVIEW OF SYSTEMS
[Abdominal Pain] : abdominal pain [Heartburn] : heartburn [Dysmenorrhea] : dysmenorrhea [Joint Pain] : joint pain [Anxiety] : anxiety [Negative] : Heme/Lymph [Vomiting] : no vomiting [Constipation] : no constipation [Diarrhea] : no diarrhea [Melena] : no melena [Dysuria] : no dysuria [Incontinence] : no incontinence [Pelvic Pain] : no pelvic pain [Vaginal Discharge] : no vaginal discharge [Abn Vaginal Bleeding] : no unexplained vaginal bleeding [Arthralgias] : no arthralgias [Joint Swelling] : no joint swelling [Joint Stiffness] : no joint stiffness [Limb Pain] : no limb pain [Limb Swelling] : no limb swelling [Suicidal] : not suicidal [Sleep Disturbances] : no sleep disturbances [Depression] : no depression [Change In Personality] : no personality change [Emotional Problems] : no emotional problems [FreeTextEntry7] : nausea

## 2021-04-13 NOTE — DATA REVIEWED
[FreeTextEntry1] : CT abdomen/pelvis (4/25/2020) - small fat-containing umbilical hernia.  Acute sigmoid diverticulitis. No free air or pericolonic fluid collection.  Enlarged fibroid uterus.  Hepatic steatosis. \par \par US abdomen/pelvis (3/29/2021) - fatty liver.  Possible gallstone.  Borderline enlarged spleen.  Fibroid uterus.  Obscured endometrium and ovaries. Transvaginal pelvic ultrasound is recommended as clinically warranted.

## 2021-04-13 NOTE — ASSESSMENT
[FreeTextEntry1] : Ms. Tolbert is a 50-year-old woman with abdominal pain with unclear etiology.  As she has worsening GERD symptoms that are not well controlled with medication currently and has not had an upper or lower endoscopy, she was referred to gastroenterology for EGD and screening colonoscopy.  If the EGD is negative, we will consider a repeat ultrasound of the abdomen to determine whether she has cholelithiasis, and potentially an MRI versus a HIDA with CCK.

## 2021-04-15 ENCOUNTER — NON-APPOINTMENT (OUTPATIENT)
Age: 51
End: 2021-04-15

## 2021-09-03 NOTE — ED PROVIDER NOTE - DR. NAME
Pt aware of results      INR good cont present  Recheck in 1 month   Written by JOSÉ MIGUEL Gil on 9/3/2021  8:05 AM CDT Sun

## 2021-10-07 ENCOUNTER — OUTPATIENT (OUTPATIENT)
Dept: OUTPATIENT SERVICES | Facility: HOSPITAL | Age: 51
LOS: 1 days | End: 2021-10-07
Payer: COMMERCIAL

## 2021-10-07 ENCOUNTER — RESULT REVIEW (OUTPATIENT)
Age: 51
End: 2021-10-07

## 2021-10-07 ENCOUNTER — APPOINTMENT (OUTPATIENT)
Dept: ORTHOPEDIC SURGERY | Facility: CLINIC | Age: 51
End: 2021-10-07
Payer: COMMERCIAL

## 2021-10-07 VITALS
OXYGEN SATURATION: 98 % | HEART RATE: 55 BPM | HEIGHT: 63 IN | WEIGHT: 225 LBS | SYSTOLIC BLOOD PRESSURE: 131 MMHG | DIASTOLIC BLOOD PRESSURE: 78 MMHG | BODY MASS INDEX: 39.87 KG/M2

## 2021-10-07 DIAGNOSIS — R22.30 LOCALIZED SWELLING, MASS AND LUMP, UNSPECIFIED UPPER LIMB: ICD-10-CM

## 2021-10-07 DIAGNOSIS — G89.29 PAIN IN RIGHT SHOULDER: ICD-10-CM

## 2021-10-07 DIAGNOSIS — M25.511 PAIN IN RIGHT SHOULDER: ICD-10-CM

## 2021-10-07 PROCEDURE — 73030 X-RAY EXAM OF SHOULDER: CPT | Mod: 26,RT

## 2021-10-07 PROCEDURE — 99203 OFFICE O/P NEW LOW 30 MIN: CPT

## 2021-10-07 PROCEDURE — 73030 X-RAY EXAM OF SHOULDER: CPT

## 2021-10-07 NOTE — HISTORY OF PRESENT ILLNESS
[Intermit.] : ~He/She~ states the symptoms seem to be intermittent [Lifting] : worsened by lifting [de-identified] : The patient is a 51 year old, rhd woman presenting with right shoulder pain\par \par She presents with a three month history of right superolateral shoulder pain, which she thinks started after sleeping on her arm awkwardly.  The patient denies distal numbness, weakness, paresthesias.  No prior shoulder injury.  About 2 months ago, she then noticed a palpable lump at her anterior deltoid which has been slightly increased in size.  The patient denies precipitating injury or trauma.  The patient denies constitutional symptoms including fever, chills, malaise, weight loss, night sweats.\par Her shoulder pain is mostly with reaching/overhead movements.\par \par Pain is mild in intensity, described as achy, improved with rest, worse with reaching.

## 2021-10-07 NOTE — PHYSICAL EXAM
[de-identified] : General: Well-nourished, well-developed, alert, and in no acute distress.\par Head: Normocephalic.\par Eyes: Pupils equal round reactive to light and accommodation, extraocular muscles intact, normal sclera.\par Nose: No nasal discharge.\par Cardiac: Regular rate. Extremities are warm and well perfused. Distal pulses are symmetric bilaterally.\par Respiratory: No labored breathing.\par Extremities: Sensation is intact distally bilaterally.  Distal pulses are symmetric bilaterally\par Lymphatic: No regional lymphadenopathy, no lymphedema\par Neurologic: No focal deficits\par Skin: Normal skin color, texture, and turgor\par Psychiatric: Normal affect\par MSK: as noted above/below\par \par \par \par RIGHT SHOULDER:\par  \par Inspection:no bruising, rash, erythema, deformity, FLESH COLORED MASS AT ANTERIOR DELTOID\par Joint Effusion:no\par ROM:normal Forward Flexion, Abduction , MILDLY DECREASED Internal Rotation: , NORMAL External Rotation \par Palpation: MILD PAIN AT SUBACROMIAL SPACE, SLIGHTLY MOBILE, NODULAR MASS ~4CM AT ANTERIOR DELTOID, NO AC joint pain, Bicipital Groove Pain , GH joint pain , Clavicle pain , GT pain \par Distal Pulses:normal\par Upper Extremity Reflexes:2+\par Shoulder Strength: 5/5 \par Upper Extremity Sensation: normal\par \par Special Tests:\par Empty Can: Negative \par Lift-Off Test: Negative \par Cross Arm: POSITIVE\par Neer: POSITIVE\par Mendes: POSITIVE\par Speed: Negative\par Apprehension:Negative\par \par LEFT SHOULDER:\par  \par Inspection:no bruising, rash, erythema, deformity\par Joint Effusion:no\par ROM:normal Forward Flexion, Abduction , Internal Rotation: , External Rotation \par Palpation: NO AC joint pain, Bicipital Groove Pain , GH joint pain , Clavicle pain , GT pain \par Distal Pulses:normal\par Upper Extremity Reflexes:2+\par Shoulder Strength: 5/5 \par Upper Extremity Sensation: normal\par \par Special Tests:\par Empty Can: Negative \par Lift-Off Test: Negative \par Cross Arm: Negative\par Neer: Negative\par Mendes: Negative\par Speed: Negative\par Apprehension:Negative\par \par  [de-identified] : Xray RIGHT Shoulder - Multiple views were reviewed with the patient in detail.  There is no acute fracture or dislocation.  There is no joint effusion.  Joint spaces are preserved.  We will await formal radiology reading.\par \par

## 2021-10-07 NOTE — DISCUSSION/SUMMARY
[Medication Risks Reviewed] : Medication risks reviewed [de-identified] : The patient is a 51 year old, rhd woman presenting with a 3 month history of right shoulder pain.  Her impingement signs are positive.  She likely has subacromial bursitis.\par \par She also has palpable lump at anterior deltoid.  Could be lipoma, contributing to shoulder pain.\par \par Imaging/Diagnostics/Medical Records were interpreted and/or reviewed and results were reviewed with the patient in detail.  All questions were answered appropriately.\par \par MRI of the right shoulder ordered to rule out lipoma, r/o RTC tendinopathy, bursitis.\par \par The patient was counseled on activity modification.\par \par Pending results, discussed treatment options.  If focal bursitis, consider PT +/- CSI.  If sigificant lipoma, discussed referral to plastic surgery.\par \par Follow-up after MRI.\par \par ------------------------------------------------------------------------------------------------------------------\par Patient appreciates and agrees with current plan.\par \par The patient's diagnosis above was evaluated by me, personally.  Diagnostic Testing and treatment options were discussed with the patient in detail.  The risks/benefits/potential complications of diagnostic testing/treatments were described in detail.  \par \par This note was generated using a mixture of manual typing and dragon medical dictation software.  A reasonable effort has been made for proofreading its contents, but typos may still remain.  If there are any questions or points of clarification needed please notify my office.\par \par \par >30 minutes of time was spent on total encounter.  >50% of the visit was spent on face-to-face counseling/coordination of care and medical-decision making for this patient.\par

## 2021-10-18 ENCOUNTER — NON-APPOINTMENT (OUTPATIENT)
Age: 51
End: 2021-10-18

## 2021-10-22 ENCOUNTER — APPOINTMENT (OUTPATIENT)
Dept: ORTHOPEDIC SURGERY | Facility: CLINIC | Age: 51
End: 2021-10-22
Payer: COMMERCIAL

## 2021-10-22 DIAGNOSIS — M75.51 BURSITIS OF RIGHT SHOULDER: ICD-10-CM

## 2021-10-22 PROCEDURE — 99212 OFFICE O/P EST SF 10 MIN: CPT | Mod: 25

## 2021-10-22 PROCEDURE — 20611 DRAIN/INJ JOINT/BURSA W/US: CPT | Mod: RT

## 2021-10-22 NOTE — PHYSICAL EXAM
[de-identified] : General: Well-nourished, well-developed, alert, and in no acute distress.\par Head: Normocephalic.\par Eyes: Pupils equal round reactive to light and accommodation, extraocular muscles intact, normal sclera.\par Nose: No nasal discharge.\par Cardiac: Regular rate. Extremities are warm and well perfused. Distal pulses are symmetric bilaterally.\par Respiratory: No labored breathing.\par Extremities: Sensation is intact distally bilaterally.  Distal pulses are symmetric bilaterally\par Lymphatic: No regional lymphadenopathy, no lymphedema\par Neurologic: No focal deficits\par Skin: Normal skin color, texture, and turgor\par Psychiatric: Normal affect\par MSK: as noted above/below\par \par \par \par RIGHT SHOULDER:\par  \par Inspection:no bruising, rash, erythema, deformity, FLESH COLORED MASS AT ANTERIOR DELTOID\par Joint Effusion:no\par ROM:normal Forward Flexion, Abduction , MILDLY DECREASED Internal Rotation: , NORMAL External Rotation \par Palpation: MILD PAIN AT SUBACROMIAL SPACE, SLIGHTLY MOBILE, NODULAR MASS ~4CM AT ANTERIOR DELTOID, NO AC joint pain, Bicipital Groove Pain , GH joint pain , Clavicle pain , GT pain \par Distal Pulses:normal\par Upper Extremity Reflexes:2+\par Shoulder Strength: 5/5 \par Upper Extremity Sensation: normal\par \par Special Tests:\par Empty Can: Negative \par Lift-Off Test: Negative \par Cross Arm: POSITIVE\par Neer: POSITIVE\par Mendes: POSITIVE\par Speed: Negative\par Apprehension:Negative\par \par LEFT SHOULDER:\par  \par Inspection:no bruising, rash, erythema, deformity\par Joint Effusion:no\par ROM:normal Forward Flexion, Abduction , Internal Rotation: , External Rotation \par Palpation: NO AC joint pain, Bicipital Groove Pain , GH joint pain , Clavicle pain , GT pain \par Distal Pulses:normal\par Upper Extremity Reflexes:2+\par Shoulder Strength: 5/5 \par Upper Extremity Sensation: normal\par \par Special Tests:\par Empty Can: Negative \par Lift-Off Test: Negative \par Cross Arm: Negative\par Neer: Negative\par Mendes: Negative\par Speed: Negative\par Apprehension:Negative\par \par  [de-identified] : MRI Right Shoulder showing: Multiple views were reviewed with the patient in detail.\par \par \par 1. Study limited by motion artifact.\par 2. No focal soft tissue mass or fluid collection is seen.\par 3. Moderate supraspinatus, infraspinatus and subscapularis tendinosis. No rotator cuff tendon tears or muscle atrophy.\par 4. No labral tears. \par 5. Intact long head biceps tendon. \par 6. Small glenohumeral joint effusion and mild subacromial/subdeltoid bursitis. \par 7. Mild osteoarthritis of the acromioclavicular joint. \par

## 2021-10-22 NOTE — DISCUSSION/SUMMARY
[Medication Risks Reviewed] : Medication risks reviewed [de-identified] : The patient is a 51 year old, rhd woman presenting with a 3 month history of right shoulder pain likely secondary to impingement syndrome/subacromial bursitis.\par \par She also has palpable lump at anterior deltoid likely secondary to unencapsulated lipoma.\par \par Imaging/Diagnostics/Medical Records were interpreted and/or reviewed and results were reviewed with the patient in detail.  All questions were answered appropriately.\par \par The patient was counseled on the natural progression of the problem(s) today and potential complications of diagnoses.  The patient was provided reassurance today.\par \par After informed consent, and explanation of risks, benefits, alternatives, adverse effects of injection, which includes but is not limited to infection, bleeding, allergic reaction, swelling, soft tissue weakening/tendon rupture, neurovascular injury, injection site complication, fat atrophy, skin depigmentation, failure to improve symptoms, the patient would like to proceed with the procedure - RIGHT SUBACROMIAL ULTRASOUND-GUIDED CORTICOSTEROID INJECTION. See procedure note above. Patient tolerated the procedure well. The patient was provided with postinjection instructions.\par \par The patient was counseled on activity modification.\par \par Follow-up in 6-8 weeks or as needed.\par \par ------------------------------------------------------------------------------------------------------------------\par Patient appreciates and agrees with current plan.\par \par The patient's diagnosis above was evaluated by me, personally.  Diagnostic Testing and treatment options were discussed with the patient in detail.  The risks/benefits/potential complications of diagnostic testing/treatments were described in detail.  \par \par This note was generated using a mixture of manual typing and dragon medical dictation software.  A reasonable effort has been made for proofreading its contents, but typos may still remain.  If there are any questions or points of clarification needed please notify my office.\par \par \par >15 minutes of time was spent on total encounter.  >50% of the visit was spent on face-to-face counseling/coordination of care and medical-decision making for this patient.\par

## 2021-10-22 NOTE — HISTORY OF PRESENT ILLNESS
[de-identified] : The patient is a 51 year old, rhd woman presenting with right shoulder pain\par \par She was seen last week for a three month history of right superolateral shoulder pain, which she thinks started after sleeping on her arm awkwardly.  The patient denies distal numbness, weakness, paresthesias.  No prior shoulder injury.  About 2 months ago, she then noticed a palpable lump at her anterior deltoid which has been slightly increased in size.  The patient denies precipitating injury or trauma.  The patient denies constitutional symptoms including fever, chills, malaise, weight loss, night sweats.\par Her shoulder pain is mostly with reaching/overhead movements.\par \par MRI ordered\par MRI Right Shoulder showing:\par \par 1. Study limited by motion artifact.\par 2. No focal soft tissue mass or fluid collection is seen.\par 3. Moderate supraspinatus, infraspinatus and subscapularis tendinosis. No rotator cuff tendon tears or muscle atrophy.\par 4. No labral tears. \par 5. Intact long head biceps tendon. \par 6. Small glenohumeral joint effusion and mild subacromial/subdeltoid bursitis. \par 7. Mild osteoarthritis of the acromioclavicular joint. \par

## 2021-10-22 NOTE — PROCEDURE
[de-identified] : Ultrasound Guided Injection \par Indication: Ensure placement within the subacromial-subdeltoid space,  without damaging the tendons\par \par Utlizing the [employer] portable ultrasound machine, the Linear transducer, sterilized probe, using ultrasound guidance with the probe in the longitudinal axis, utilizing an in plane approach, was used for the following injection:\par \par Injection: RIGHT SUBACROMIAL-SUBDELTOID SPACE INJECTION\par Indication: SUBACROMIAL BURSITIS\par \par A discussion was had with the patient regarding this procedure and all questions were answered. All risks, benefits and alternatives were discussed. These included but were not limited to bleeding, infection, injection site reaction/complication and allergic reaction. A timeout was performed prior to the procedure to ensure proper side.  Utilizing ultrasound guidance, the subacromial space was visualized.  Alcohol was used to clean and sterilize the skin over the anterior aspect of the shoulder. A 25-gauge needle was used to inject 2cc of lidocaine 1% without epinephrine, 1cc of 0.5% bupivicaine and 1cc of kenalog into the subacromial-subdeltoid space.   A sterile bandage was then applied. The patient tolerated the procedure well and there were no complications.\par

## 2022-02-21 ENCOUNTER — TRANSCRIPTION ENCOUNTER (OUTPATIENT)
Age: 52
End: 2022-02-21

## 2022-02-22 ENCOUNTER — EMERGENCY (EMERGENCY)
Facility: HOSPITAL | Age: 52
LOS: 1 days | Discharge: HOME | End: 2022-02-22
Attending: EMERGENCY MEDICINE | Admitting: EMERGENCY MEDICINE
Payer: COMMERCIAL

## 2022-02-22 VITALS
RESPIRATION RATE: 16 BRPM | SYSTOLIC BLOOD PRESSURE: 145 MMHG | OXYGEN SATURATION: 98 % | HEIGHT: 63 IN | TEMPERATURE: 98 F | DIASTOLIC BLOOD PRESSURE: 90 MMHG | HEART RATE: 71 BPM | WEIGHT: 225.09 LBS

## 2022-02-22 DIAGNOSIS — M79.641 PAIN IN RIGHT HAND: ICD-10-CM
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
## 2022-02-22 DIAGNOSIS — S62.501A FRACTURE OF UNSPECIFIED PHALANX OF RIGHT THUMB, INITIAL ENCOUNTER FOR CLOSED FRACTURE: ICD-10-CM
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
## 2022-02-22 PROCEDURE — 99284 EMERGENCY DEPT VISIT MOD MDM: CPT | Mod: 25

## 2022-02-22 PROCEDURE — 73130 X-RAY EXAM OF HAND: CPT | Mod: 26,RT
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
## 2022-02-22 NOTE — ED PROVIDER NOTE - PATIENT PORTAL LINK FT
You can access the FollowMyHealth Patient Portal offered by [facility] by registering at the following website: [email or web address]. By joining [employer]’s FollowMyHealth portal, you will also be able to view your health information using other applications (apps) compatible with our system.

## 2022-02-22 NOTE — ED PROVIDER NOTE - NSFOLLOWUPINSTRUCTIONS_ED_ALL_ED_FT
Fracture tip of the Right thumb.     It should heal on its own within 6-8 weeks. Please follow up with one of our hand MDs.     A fracture is a break in one of your bones. This can occur from a variety of injuries, especially traumatic ones. Symptoms include pain, bruising, or swelling. Do not use the injured limb. If a fracture is in one of the bones below your waist, do not put weight on that limb unless instructed to do so by your healthcare provider. Crutches or a cane may have been provided. A splint or cast may have been applied by your health care provider. Make sure to keep it dry and follow up with an orthopedist as instructed.    SEEK IMMEDIATE MEDICAL CARE IF YOU HAVE ANY OF THE FOLLOWING SYMPTOMS: numbness, tingling, increasing pain, or weakness in any part of the injured limb.     Finger Sprain    A sprain is a stretch or tear in one of the tough, fiber-like tissues (ligaments) in your body. This is caused by an injury to the area such as a twisting mechanism. Symptoms include pain, swelling, or bruising. Rest that area over the next several days and slowly resume activity when tolerated. Ice can help with swelling and pain.     SEEK IMMEDIATE MEDICAL CARE IF YOU HAVE ANY OF THE FOLLOWING SYMPTOMS: worsening pain, inability to move that body part, numbness or tingling.     OTC Motrin/Advil (ibuprofen) 600mg every 6h and/or Tylenol (acetaminophen) 1000mg every 6h for pain.   Return for worse pain, new worrisome symptoms or other medical emergencies.
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
## 2022-02-22 NOTE — ED ADULT NURSE NOTE - OBJECTIVE STATEMENT
pt a&ox3 c/o R. hand pain s/p tripping over an escalator step. has ROM with pain. will continue to monitor.

## 2022-02-22 NOTE — ED PROVIDER NOTE - CARE PROVIDER_API CALL
Mehul Rose)  Plastic Surgery  [address], [address]  [place] [address]  Phone: [phone]  Fax: [phone]  Follow Up Time:

## 2022-03-15 ENCOUNTER — APPOINTMENT (OUTPATIENT)
Dept: ORTHOPEDIC SURGERY | Facility: CLINIC | Age: 52
End: 2022-03-15
Payer: COMMERCIAL

## 2022-03-15 VITALS — WEIGHT: 220 LBS | RESPIRATION RATE: 15 BRPM | BODY MASS INDEX: 40.48 KG/M2 | HEIGHT: 62 IN

## 2022-03-15 DIAGNOSIS — S62.521D DISPLACED FRACTURE OF DISTAL PHALANX OF RIGHT THUMB, SUBSEQUENT ENCOUNTER FOR FRACTURE WITH ROUTINE HEALING: ICD-10-CM

## 2022-03-15 DIAGNOSIS — S69.91XA UNSPECIFIED INJURY OF RIGHT WRIST, HAND AND FINGER(S), INITIAL ENCOUNTER: ICD-10-CM

## 2022-03-15 PROCEDURE — 73130 X-RAY EXAM OF HAND: CPT | Mod: RT

## 2022-03-15 PROCEDURE — 99214 OFFICE O/P EST MOD 30 MIN: CPT

## 2022-09-13 ENCOUNTER — EMERGENCY (EMERGENCY)
Facility: HOSPITAL | Age: 52
LOS: 1 days | Discharge: HOME | End: 2022-09-13
Attending: STUDENT IN AN ORGANIZED HEALTH CARE EDUCATION/TRAINING PROGRAM | Admitting: EMERGENCY MEDICINE

## 2022-09-13 VITALS
HEART RATE: 56 BPM | SYSTOLIC BLOOD PRESSURE: 166 MMHG | HEIGHT: 63 IN | WEIGHT: 225.09 LBS | RESPIRATION RATE: 18 BRPM | OXYGEN SATURATION: 97 % | DIASTOLIC BLOOD PRESSURE: 93 MMHG | TEMPERATURE: 98 F

## 2022-09-13 LAB
ANION GAP SERPL CALC-SCNC: 7 MMOL/L — LOW (ref 9–16)
BASOPHILS # BLD AUTO: 0.05 K/UL — SIGNIFICANT CHANGE UP (ref 0–0.2)
BASOPHILS NFR BLD AUTO: 0.8 % — SIGNIFICANT CHANGE UP (ref 0–2)
BUN SERPL-MCNC: 13 MG/DL — SIGNIFICANT CHANGE UP (ref 7–23)
CALCIUM SERPL-MCNC: 9.3 MG/DL — SIGNIFICANT CHANGE UP (ref 8.5–10.5)
CHLORIDE SERPL-SCNC: 104 MMOL/L — SIGNIFICANT CHANGE UP (ref 96–108)
CO2 SERPL-SCNC: 28 MMOL/L — SIGNIFICANT CHANGE UP (ref 22–31)
CREAT SERPL-MCNC: 0.78 MG/DL — SIGNIFICANT CHANGE UP (ref 0.5–1.3)
EGFR: 90 ML/MIN/1.73M2 — SIGNIFICANT CHANGE UP
EOSINOPHIL # BLD AUTO: 0.14 K/UL — SIGNIFICANT CHANGE UP (ref 0–0.5)
EOSINOPHIL NFR BLD AUTO: 2.2 % — SIGNIFICANT CHANGE UP (ref 0–6)
GLUCOSE SERPL-MCNC: 102 MG/DL — HIGH (ref 70–99)
HCT VFR BLD CALC: 44 % — SIGNIFICANT CHANGE UP (ref 34.5–45)
HGB BLD-MCNC: 15.2 G/DL — SIGNIFICANT CHANGE UP (ref 11.5–15.5)
IMM GRANULOCYTES NFR BLD AUTO: 0.3 % — SIGNIFICANT CHANGE UP (ref 0–1.5)
LYMPHOCYTES # BLD AUTO: 2.11 K/UL — SIGNIFICANT CHANGE UP (ref 1–3.3)
LYMPHOCYTES # BLD AUTO: 32.7 % — SIGNIFICANT CHANGE UP (ref 13–44)
MAGNESIUM SERPL-MCNC: 1.9 MG/DL — SIGNIFICANT CHANGE UP (ref 1.6–2.6)
MCHC RBC-ENTMCNC: 31.5 PG — SIGNIFICANT CHANGE UP (ref 27–34)
MCHC RBC-ENTMCNC: 34.5 GM/DL — SIGNIFICANT CHANGE UP (ref 32–36)
MCV RBC AUTO: 91.1 FL — SIGNIFICANT CHANGE UP (ref 80–100)
MONOCYTES # BLD AUTO: 0.46 K/UL — SIGNIFICANT CHANGE UP (ref 0–0.9)
MONOCYTES NFR BLD AUTO: 7.1 % — SIGNIFICANT CHANGE UP (ref 2–14)
NEUTROPHILS # BLD AUTO: 3.67 K/UL — SIGNIFICANT CHANGE UP (ref 1.8–7.4)
NEUTROPHILS NFR BLD AUTO: 56.9 % — SIGNIFICANT CHANGE UP (ref 43–77)
NRBC # BLD: 0 /100 WBCS — SIGNIFICANT CHANGE UP (ref 0–0)
PLATELET # BLD AUTO: 165 K/UL — SIGNIFICANT CHANGE UP (ref 150–400)
POTASSIUM SERPL-MCNC: 4.9 MMOL/L — SIGNIFICANT CHANGE UP (ref 3.5–5.3)
POTASSIUM SERPL-SCNC: 4.9 MMOL/L — SIGNIFICANT CHANGE UP (ref 3.5–5.3)
RBC # BLD: 4.83 M/UL — SIGNIFICANT CHANGE UP (ref 3.8–5.2)
RBC # FLD: 13.3 % — SIGNIFICANT CHANGE UP (ref 10.3–14.5)
SODIUM SERPL-SCNC: 139 MMOL/L — SIGNIFICANT CHANGE UP (ref 132–145)
WBC # BLD: 6.45 K/UL — SIGNIFICANT CHANGE UP (ref 3.8–10.5)
WBC # FLD AUTO: 6.45 K/UL — SIGNIFICANT CHANGE UP (ref 3.8–10.5)

## 2022-09-13 PROCEDURE — 99284 EMERGENCY DEPT VISIT MOD MDM: CPT

## 2022-09-13 NOTE — ED PROVIDER NOTE - NSFOLLOWUPINSTRUCTIONS_ED_ALL_ED_FT
Your blood work did not show any significant abnormalities.    1) Follow up with your doctor when they return from vacation. Inquire about additional blood tests including checking a B-12 and Thiamine level  2) Return to the ED immediately for new or worsening symptoms   3) Please continue to take any home medications as prescribed  4) Your test results from your ED visit were discussed with you prior to discharge  5) You were provided with a copy of your test results
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
## 2022-09-13 NOTE — ED PROVIDER NOTE - PATIENT PORTAL LINK FT
You can access the FollowMyHealth Patient Portal offered by [facility] by registering at the following website: [email or web address]. By joining [employer]’s FollowMyHealth portal, you will also be able to view your health information using other applications (apps) compatible with our system.

## 2022-09-13 NOTE — ED PROVIDER NOTE - PHYSICAL EXAMINATION
Gen: NAD, AOx3, able to make needs known, non-toxic  Head: NCAT  HEENT: EOMI, oral mucosa moist, normal conjunctiva  Lung: CTAB, no respiratory distress, speaking in full sentences  CV: RRR  Abd: Soft, nontender, no guarding, no CVA tenderness  MSK: (+) Strength 5/5 x4 extremities, no appreciable sensitivity deficits in extremities.  Neuro: Appears non focal  Skin: Warm, well perfused, no rash  Psych: normal affect Gen: NAD, AOx3, able to make needs known, non-toxic  Head: NCAT  HEENT: EOMI, oral mucosa moist, normal conjunctiva  Lung: CTAB, no respiratory distress, speaking in full sentences  CV: RRR  Abd: Soft, nontender, no guarding, no CVA tenderness  MSK: no visible deformities, no calf tenderness b/l, no leg redness b/l, no UE/LE edema b/l. Extremities NVI x4  Neuro: Appears non focal, (+) Strength 5/5 x4 extremities, no appreciable sensory deficits in extremities.  Skin: Warm, well perfused  Psych: normal affect

## 2022-09-13 NOTE — ED ADULT TRIAGE NOTE - CHIEF COMPLAINT QUOTE
pt. c/o feeling of pins and needles in her hands and feet intermittently for about 1 week. Pt. reports started after flying [place] from [place].

## 2022-09-13 NOTE — ED PROVIDER NOTE - NS_ ATTENDINGSCRIBEDETAILS _ED_A_ED_FT
Attending Vishal: The scribe's documentation has been prepared under my direction and personally reviewed by me in its entirety. I confirm that the note above accurately reflects all work, treatment, procedures, and medical decision making performed by me.

## 2022-09-13 NOTE — ED PROVIDER NOTE - OBJECTIVE STATEMENT
51 yo F with PMHx of HTN presenting for intermittent tingling and pain to bilateral feet and hands x 2 days. She says symptoms are mostly felt in her feet and sometimes L sided more than R. Reports legs tingling improves with walking and worsens with sitting. Pt first noticed symptoms on Saturday night and states it worsened for 1 hour on Sunday night. Her usual PMD is currently unavailable. Does not smoke at baseline but reposts smoking while on vacation. Recent travel from [place] 1 week ago. Reports she should be on blood pressure medication but is not because her PMD is out. No numbness. 53 yo F with PMHx of HTN presenting for intermittent tingling and pain to bilateral feet and hands x 2 days. She says symptoms are mostly felt in her feet and sometimes L sided more than R. Reports legs tingling improves with walking and worsens with sitting. Pt first noticed symptoms on Saturday night and states it worsened for 1 hour on Sunday night. Her usual PMD is currently unavailable which prompted her ED visit. Does not smoke at baseline but reposts smoking while on vacation. Recent travel from [place] 1 week ago. Reports she should be on HLD medication but is not because her PMD is out and she has not been given the prescription yet. No numbness. Reports currently not experiencing the tingling sensation.

## 2022-09-13 NOTE — ED PROVIDER NOTE - NS ED MD DISPO DISCHARGE CCDA
Left voicemail for patient to give office a call to discuss x-ray results.    Patient/Caregiver provided printed discharge information.

## 2022-09-16 DIAGNOSIS — R20.2 PARESTHESIA OF SKIN: ICD-10-CM

## 2022-09-16 DIAGNOSIS — I10 ESSENTIAL (PRIMARY) HYPERTENSION: ICD-10-CM

## 2022-09-16 DIAGNOSIS — M79.642 PAIN IN LEFT HAND: ICD-10-CM

## 2022-09-16 DIAGNOSIS — M79.671 PAIN IN RIGHT FOOT: ICD-10-CM

## 2022-09-16 DIAGNOSIS — M79.641 PAIN IN RIGHT HAND: ICD-10-CM

## 2022-09-16 DIAGNOSIS — M79.672 PAIN IN LEFT FOOT: ICD-10-CM

## 2022-11-05 ENCOUNTER — EMERGENCY (EMERGENCY)
Facility: HOSPITAL | Age: 52
LOS: 1 days | Discharge: HOME | End: 2022-11-05
Attending: EMERGENCY MEDICINE | Admitting: EMERGENCY MEDICINE

## 2022-11-05 VITALS
SYSTOLIC BLOOD PRESSURE: 132 MMHG | DIASTOLIC BLOOD PRESSURE: 85 MMHG | RESPIRATION RATE: 19 BRPM | WEIGHT: 225.09 LBS | OXYGEN SATURATION: 97 % | TEMPERATURE: 98 F | HEIGHT: 63 IN | HEART RATE: 66 BPM

## 2022-11-05 PROCEDURE — 99283 EMERGENCY DEPT VISIT LOW MDM: CPT

## 2022-11-05 RX ADMIN — Medication 1 TABLET(S): at 23:41

## 2022-11-05 NOTE — ED PROVIDER NOTE - OBJECTIVE STATEMENT
53 y/o F p/w L 2nd digit pain/swelling around the nailbed getting worse for 2 days. No fever/chills. + bites nails.

## 2022-11-05 NOTE — ED PROVIDER NOTE - NSFOLLOWUPINSTRUCTIONS_ED_ALL_ED_FT
Paronychia      Paronychia is an infection of the skin that surrounds a nail. It usually affects the skin around a fingernail, but it may also occur near a toenail. It often causes pain and swelling around the nail. In some cases, a collection of pus (abscess) can form near or under the nail.     This condition may develop suddenly, or it may develop gradually over a longer period. In most cases, paronychia is not serious, and it will clear up with treatment.      What are the causes?    This condition may be caused by bacteria or a fungus, such as yeast. The bacteria or fungus can enter the body through an opening in the skin, such as a cut or a hangnail, and cause an infection in your fingernail or toenail. Other causes may include:  •Recurrent injury to the fingernail or toenail area.      •Irritation of the base and sides of the nail (cuticle).      Injury and irritation can result in inflammation, swelling, and thickened skin around the nail.      What increases the risk?    This condition is more likely to develop in people who:  •Get their hands wet often, such as those who work as dishwashers, , or housekeepers.      •Bite their fingernails or cuticles.      •Have underlying skin conditions.      •Have hangnails or injured fingertips.      •Are exposed to irritants like detergents and other chemicals.      •Have diabetes.        What are the signs or symptoms?    Symptoms of this condition include:  •Redness and swelling of the skin near the nail.      •Tenderness around the nail when you touch the area.      •Pus-filled bumps under the cuticle.      •Fluid or pus under the nail.      •Throbbing pain in the area.        How is this diagnosed?    This condition is diagnosed with a physical exam. In some cases, a sample of pus may be tested to determine what type of bacteria or fungus is causing the condition.      How is this treated?    Treatment depends on the cause and severity of your condition. If your condition is mild, it may clear up on its own in a few days or after soaking in warm water. If needed, treatment may include:  •Antibiotic medicine, if your infection is caused by bacteria.      •Antifungal medicine, if your infection is caused by a fungus.      •A procedure to drain pus from an abscess.      •Anti-inflammatory medicine (corticosteroids).      •Removal of part of an ingrown toenail.      A bandage (dressing) may be placed over the affected area if an abscess or part of a nail has been removed.      Follow these instructions at home:    Wound care     •Keep the affected area clean.      •Soak the affected area in warm water if told to do so by your health care provider. You may be told to do this for 20 minutes, 2–3 times a day.      •Keep the area dry when you are not soaking it.      • Do not try to drain an abscess yourself.    •Follow instructions from your health care provider about how to take care of the affected area. Make sure you:  •Wash your hands with soap and water for at least 20 seconds before and after you change your dressing. If soap and water are not available, use hand .      •Change your dressing as told by your health care provider.      •If you had an abscess drained, check the area every day for signs of infection. Check for:  •Redness, swelling, or pain.      •Fluid or blood.      •Warmth.      •Pus or a bad smell.          Medicines   A prescription pill bottle with an example of a pill.   •Take over-the-counter and prescription medicines only as told by your health care provider.      •If you were prescribed an antibiotic medicine, take it as told by your health care provider. Do not stop taking the antibiotic even if you start to feel better.      General instructions     •Avoid contact with any skin irritants or allergens.      • Do not pick at the affected area.      •Keep all follow-up visits as told. This is important.      Prevention     To prevent this condition from happening again:  •Wear rubber gloves when washing dishes or doing other tasks that require your hands to get wet.      •Wear gloves if your hands might come in contact with  or other chemicals.      •Avoid injuring your nails or fingertips.      •Do not bite your nails or tear hangnails.      •Do not cut your nails very short.      •Do not cut your cuticles.      •Use clean nail clippers or scissors when trimming nails.        Contact a health care provider if:    •Your symptoms get worse or do not improve with treatment.      •You have continued or increased fluid, blood, or pus coming from the affected area.      •Your affected finger, toe, or joint becomes swollen or difficult to move.      •You have a fever or chills.      •There is redness spreading away from the affected area.        Summary    •Paronychia is an infection of the skin that surrounds a nail. It often causes pain and swelling around the nail. In some cases, a collection of pus (abscess) can form near or under the nail.      •This condition may be caused by bacteria or a fungus. These germs can enter the body through an opening in the skin, such as a cut or a hangnail.      •If your condition is mild, it may clear up on its own in a few days. If needed, treatment may include medicine or a procedure to drain pus from an abscess.      •To prevent this condition from happening again, wear gloves if doing tasks that require your hands to get wet or to come in contact with chemicals. Also avoid injuring your nails or fingertips.      This information is not intended to replace advice given to you by your health care provider. Make sure you discuss any questions you have with your health care provider.

## 2022-11-05 NOTE — ED ADULT TRIAGE NOTE - GLASGOW COMA SCALE: SCORE, MLM
Prep Survey      Responses   [facility] facility patient discharged from?  [facility]   Is LACE score < 7 ?  No   Emergency Room discharge w/ pulse ox?  No   Eligibility  Readm Mgmt   Discharge diagnosis  Real time reverse transcriptase PCR positive for COVID-19 virus   Does the patient have one of the following disease processes/diagnoses(primary or secondary)?  COVID-19   Does the patient have Home health ordered?  No   Is there a DME ordered?  No   Prep survey completed?  Yes          Chelo Khalil RN        
15

## 2022-11-05 NOTE — ED ADULT NURSE NOTE - OBJECTIVE STATEMENT
Patient A0X3 ambulatory came to ED c/o abscess to finger x2 days patient denies any trauma or any other complaints.

## 2022-11-05 NOTE — ED ADULT NURSE NOTE - NSIMPLEMENTINTERV_GEN_ALL_ED
Implemented All Universal Safety Interventions:  [place] to call system. Call bell, personal items and telephone within reach. Instruct patient to call for assistance. Room bathroom lighting operational. Non-slip footwear when patient is off stretcher. Physically safe environment: no spills, clutter or unnecessary equipment. Stretcher in lowest position, wheels locked, appropriate side rails in place.

## 2022-11-05 NOTE — ED PROVIDER NOTE - DOMESTIC TRAVEL HIGH RISK QUESTION
"Chief Complaint   Patient presents with   • Medication Refill     Postmenopausal bleeding      Subjective:     HPI:     Alis Gonzalez is a 51 y.o. female   here to discuss the evaluation and management of:     Presents today with reports of having a full 5 day menstrual cycle on 2/7/22  after not having one for 2.5 years. She had associated symptoms including clotting, cramps, headaches and breast tenderness.   Last saw Dr. Chloe Eller. She is feeling emotional and feeling a little \"off.\" She does mention she recently moved into a new house. Possibly some stress involvement. She has as headache today and feels like she will have another bleed today.     Last Pelvic U/S on file was 7/2018    \"1.8 cm and 1.4 cm masses within the lower uterine segment consistent with fibroids.  9.0 mm echogenic nodule containing calcifications located within left ovary. Indeterminate etiology. Neoplasm not excluded. Follow-up recommended.\"    Insomnia  Requesting refill of Ambien.  checked.     Diabetes  Tells me she stopped taking the Metformin.  Try to work on her diet.  Last A1c 6.7%.    Dyslipidemia  Not interested in statin therapy.  Last .    ROS:  Denies any Headache, Blurred Vision, Confusion, Chest pain,  Shortness of breath,  Abdominal pain, Changes of bowel or bladder, Lower ext edema, Fevers, Nights sweats, Weight Changes, Focal weakness or numbness.  And all other systems reviewed and are all negative. POSITIVE FOR : see above        Current Outpatient Medications:   •  zolpidem (AMBIEN) 5 MG Tab, Take 0.5-1 Tablets by mouth at bedtime as needed for Sleep for up to 30 days., Disp: 30 Tablet, Rfl: 5  •  triamcinolone acetonide (KENALOG) 0.025 % Cream, , Disp: , Rfl:   •  Blood Glucose Monitoring Suppl (ONE TOUCH ULTRA 2) w/Device Kit, USE AS DIRECTED, Disp: , Rfl:   •  ONETOUCH ULTRA strip, USE ONE STRIP TO TEST BLOOD SUGAR ONCE DAILY ., Disp: , Rfl:   •  lisinopril (PRINIVIL) 20 MG Tab, Take 1 Tablet by mouth every "
day., Disp: 90 Tablet, Rfl: 2  •  VENTOLIN  (90 Base) MCG/ACT Aero Soln inhalation aerosol, Inhale 1-2 Puffs every four hours as needed., Disp: 18 g, Rfl: 6  •  Blood Glucose Test Strips, Use one  strip to test blood sugar once daily ., Disp: 100 Strip, Rfl: 2  •  Lancets, Use one  lancet to test blood sugar once daily ., Disp: 100 Each, Rfl: 2  •  Blood Glucose Meter Kit, Test blood sugar as recommended by provider.  Pharmacy/insurance preference blood glucose monitoring kit., Disp: 1 Kit, Rfl: 0  •  Alcohol Swabs, Wipe site with prep pad prior to injection., Disp: 100 Each, Rfl: 2  •  levothyroxine (SYNTHROID) 75 MCG Tab, TAKE 1 TAB BY MOUTH EVERY MORNING ON AN EMPTY STOMACH., Disp: 90 tablet, Rfl: 3  •  DUPIXENT 300 MG/2ML Solution Prefilled Syringe injection, , Disp: , Rfl:     Allergies   Allergen Reactions   • Azithromycin Rash     All over body   • Ciprofloxacin Rash     All over body   • Penicillins Rash     Rash         Past Medical History:   Diagnosis Date   • Alopecia 2015   • Asthma    • Daytime sleepiness    • Fatigue    • Insomnia    • Thyroid disease      History reviewed. No pertinent surgical history.  Family History   Problem Relation Age of Onset   • Thyroid Mother         cancer-40's   • Cancer Father         skin-melanoma   • Hyperlipidemia Father    • Cancer Maternal Aunt         breast to brain   • Breast Cancer Maternal Aunt      Social History     Socioeconomic History   • Marital status: Single     Spouse name: Not on file   • Number of children: Not on file   • Years of education: Not on file   • Highest education level: Not on file   Occupational History   • Not on file   Tobacco Use   • Smoking status: Never Smoker   • Smokeless tobacco: Never Used   Vaping Use   • Vaping Use: Never used   Substance and Sexual Activity   • Alcohol use: Not Currently     Comment: rarely   • Drug use: No   • Sexual activity: Yes     Partners: Male   Other Topics Concern   • Not on file   Social 
"History Narrative   • Not on file     Social Determinants of Health     Financial Resource Strain: Not on file   Food Insecurity: Not on file   Transportation Needs: Not on file   Physical Activity: Not on file   Stress: Not on file   Social Connections: Not on file   Intimate Partner Violence: Not on file   Housing Stability: Not on file       Objective:     Vitals: /76 (BP Location: Right arm, Patient Position: Sitting, BP Cuff Size: Large adult)   Pulse 82   Temp 36.4 °C (97.5 °F) (Temporal)   Ht 1.651 m (5' 5\")   Wt 89.5 kg (197 lb 5 oz)   SpO2 95%   BMI 32.83 kg/m²    General: Alert, pleasant, NAD  HEENT: Normocephalic.  Neck supple.   Respiratory: no distress, no audible wheezing, RR -WNL  Skin: Warm, dry, no rashes.  Extremities: No leg edema. No discoloration  Neurological: No tremors  Psych:  Affect/mood is normal, judgement is good, memory is intact, grooming is appropriate.    Assessment/Plan:     Alis was seen today for medication refill.    Diagnoses and all orders for this visit:    Postmenopausal bleeding  New problem, uncertain prognosis at this time.  Recommend following labs as well as transvaginal ultrasound.  Have also discussed following up with gynecology.  -     US-PELVIC COMPLETE (TRANSABDOMINAL/TRANSVAGINAL) (COMBO); Future  -     TSH WITH REFLEX TO FT4; Future  -     FSH/LH; Future  -     PROLACTIN; Future  -     CBC WITH DIFFERENTIAL; Future  -     TESTOSTERONE F&T FEMALES/CHILD; Future  -     ESTRADIOL; Future    Insomnia, unspecified type  Chronic. Ambien has been helpful for this.  checked. Follow up every 6 months for refills.  -     zolpidem (AMBIEN) 5 MG Tab; Take 0.5-1 Tablets by mouth at bedtime as needed for Sleep for up to 30 days.    Type 2 diabetes mellitus without complication, without long-term current use of insulin (HCC)  Stopped taking metformin sometime ago.  Last A1c 6.7%.  Would recommend restarting Metformin.  Will order follow-up labs.  Continue work on "
lifestyle management.  -     HEMOGLOBIN A1C; Future    Dyslipidemia  Not well controlled.  Previously has mentioned that she is not interested in statin therapy.   LDL at 146.  Have discussed concerns for increase for CV event due to known diagnosis of diabetes.          -LIPID PANEL      Return in about 6 months (around 9/2/2022) for for med refill. .          Comfort GILLIS.  
Yes
No

## 2022-11-05 NOTE — ED PROVIDER NOTE - PHYSICAL EXAMINATION
CONSTITUTIONAL: Well appearing, well nourished, awake, alert, oriented to person, place, time/situation and in no apparent distress.  ENT: Airway patent, Nasal mucosa clear. Mouth with normal mucosa.  EYES: Clear bilaterally.  RESPIRATORY: Breathing comfortably with normal RR.  MSK: Range of motion is not limited, + L 2nd digit paronychia  NEURO: Alert and oriented, no focal deficits.  SKIN: Skin normal color for race, warm, dry and intact. No evidence of rash.  PSYCH: Alert and oriented to person, place, time/situation. normal mood and affect. no apparent risk to self or others.

## 2022-11-05 NOTE — ED PROVIDER NOTE - PATIENT PORTAL LINK FT
You can access the FollowMyHealth Patient Portal offered by [facility] by registering at the following website: [email or web address]. By joining [employer]’s FollowMyHealth portal, you will also be able to view your health information using other applications (apps) compatible with our system.

## 2022-11-07 DIAGNOSIS — L03.012 CELLULITIS OF LEFT FINGER: ICD-10-CM

## 2024-02-09 ENCOUNTER — EMERGENCY (EMERGENCY)
Facility: HOSPITAL | Age: 54
LOS: 1 days | Discharge: HOME | End: 2024-02-09
Attending: EMERGENCY MEDICINE | Admitting: EMERGENCY MEDICINE
Payer: COMMERCIAL

## 2024-02-09 VITALS
TEMPERATURE: 98 F | OXYGEN SATURATION: 96 % | DIASTOLIC BLOOD PRESSURE: 81 MMHG | SYSTOLIC BLOOD PRESSURE: 138 MMHG | RESPIRATION RATE: 16 BRPM | HEART RATE: 71 BPM

## 2024-02-09 VITALS
WEIGHT: 225.09 LBS | RESPIRATION RATE: 16 BRPM | HEIGHT: 63 IN | SYSTOLIC BLOOD PRESSURE: 138 MMHG | TEMPERATURE: 98 F | DIASTOLIC BLOOD PRESSURE: 89 MMHG | OXYGEN SATURATION: 97 % | HEART RATE: 81 BPM

## 2024-02-09 PROCEDURE — 99284 EMERGENCY DEPT VISIT MOD MDM: CPT

## 2024-02-09 RX ORDER — ACETAMINOPHEN 500 MG
650 TABLET ORAL ONCE
Refills: 0 | Status: COMPLETED | OUTPATIENT
Start: 2024-02-09 | End: 2024-02-09

## 2024-02-09 RX ADMIN — Medication 650 MILLIGRAM(S): at 21:00

## 2024-02-09 NOTE — ED ADULT NURSE NOTE - OBJECTIVE STATEMENT
pt is a 53y female c/o avulsion. Pt states she was using a knife and cut the tip of her L thumb. ROM and sensation intact.

## 2024-02-09 NOTE — ED PROVIDER NOTE - NSFOLLOWUPINSTRUCTIONS_ED_ALL_ED_FT
Skin Avulsion    WHAT YOU NEED TO KNOW:    Skin avulsion is a wound that happens when skin is torn from your body during an accident or other injury. The torn skin may be lost or too damaged to be repaired, and it must be removed. A wound of this type cannot be stitched closed because there is tissue missing. Avulsion wounds are usually bigger and have more scars because of the missing tissue.    DISCHARGE INSTRUCTIONS:    Medicines:   Antibiotic ointment: Your healthcare provider may tell you to gently rub a topical antibiotic ointment on your wound. This will help prevent an infection and help your wound heal faster.     Pain medicine: You may be given medicine to take away or decrease pain. Do not wait until the pain is severe before you take your medicine.    NSAIDs, such as ibuprofen, help decrease swelling, pain, and fever. This medicine is available with or without a doctor's order. NSAIDs can cause stomach bleeding or kidney problems in certain people. If you take blood thinner medicine, always ask if NSAIDs are safe for you. Always read the medicine label and follow directions. Do not give these medicines to children under 6 months of age without direction from your child's healthcare provider.    Take your medicine as directed. Contact your healthcare provider if you think your medicine is not helping or if you have side effects. Tell him of her if you are allergic to any medicine. Keep a list of the medicines, vitamins, and herbs you take. Include the amounts, and when and why you take them. Bring the list or the pill bottles to follow-up visits. Carry your medicine list with you in case of an emergency.    Care for your wound: Avulsion wounds may take longer to heal because they cannot be closed with tape or stitches. Keep your wound clean and protected to prevent infection and speed healing.     Clean your wound: Wash your hands with soap and water before and after you care for your wound. You may be able to use a soft cloth to gently clean the wound after the first 24 to 48 hours. After that, gently clean the wound once or twice a day with cool water. Do not soak your wound. Use soap to clean around the wound, but try not to get any on the wound itself. Do not use alcohol or hydrogen peroxide to clean your wound unless you are directed to. Gently pat the area dry and reapply the bandage as directed.     Elevate your wound: Prop your injured area on pillows to raise it above the level of your heart. This will help reduce pain and swelling. Do this for 30 minutes at a time, as often as you can.     Bandage your wound: Bandages keep your wound clean, dry, and protected from infection. They may also prevent swelling. Use a bandage that does not stick to your wound, and has a spongy layer to absorb fluids. Leave your bandage on as long as directed. Ask your healthcare provider when and how to change your bandage. Do not wrap the bandage too tightly. This could cut off blood flow and cause more injury.     Use cool compresses: Wet a washcloth or towel with cool water and hold it on your wound as directed. Ask how often to apply the compress and for how long each time.    Reduce scarring: Avoid direct sunlight on your wound. Sunlight may burn or change the color of the new skin over your wound. Use sunscreen (SPF 30 or higher) on the new skin for at least 1 year after it heals.    Support for leg and arm wounds: You may need to use crutches if the wound is on your leg. You may need to use a sling if the wound is on your arm. Crutches and slings help protect the injured area, prevent further injury, and heal the area in the right position.     Follow up with your healthcare provider within 2 days or as directed: If you have stitches, ask when to return to have them removed. Write down your questions so you remember to ask them during your visits.     Contact your healthcare provider if:   You have new pain, or it gets worse.   You have trouble moving the injured body area.   Your wound splits open or does not seem to be healing.    Return to the emergency department if:   You have a fever.   You have painful swelling, redness, or warmth around your wound.  Your wound is red and there are red streaks on your skin starting at your wound and moving upward.   Your wound is draining pus.   You have heavy bleeding or bleeding that does not stop after 10 minutes of holding firm, direct pressure over the wound.  You feel like there is an object stuck in your wound.        © Copyright [employer] 2019 All illustrations and images included in CareNotes are the copyrighted property of [employer] or [employer].

## 2024-02-09 NOTE — ED PROVIDER NOTE - OBJECTIVE STATEMENT
53F presents with left thumb avulsion injury from knife that occurred around 5p. Pain at the site, moderate, throbbing. Cleansed with water at home pta. Tdap is current per patient.

## 2024-02-09 NOTE — ED PROVIDER NOTE - PATIENT PORTAL LINK FT
You can access the FollowMyHealth Patient Portal offered by [facility] by registering at the following website: [email or web address]. By joining [employer]’s FollowMyHealth portal, you will also be able to view your health information using other applications (apps) compatible with our system.

## 2024-02-09 NOTE — ED PROVIDER NOTE - PHYSICAL EXAMINATION
Refill request received from pharmacy for atorvastatin and lisinopril. Script was refilled per protocol. PE: A&Ox3, well appearing, NAD, NCAT, regular respiratory effort, moving all four extremities equally.   L thumb: 1cm avulsion to distal tip of thumb with oozing of blood; FROM

## 2024-02-09 NOTE — ED PROVIDER NOTE - CLINICAL SUMMARY MEDICAL DECISION MAKING FREE TEXT BOX
53F presents with left thumb avulsion injury from knife that occurred around 5p. Pain at the site, moderate, throbbing. Cleansed with water at home pta. Tdap is current per patient.    PE: A&Ox3, well appearing, NAD, NCAT, regular respiratory effort, moving all four extremities equally.   L thumb: 1cm avulsion to distal tip of thumb with oozing of blood; FROM    MDM: Patient here with avulsion to L thumb. Tdap current. Will place surgicel dressing and give meds for pain.

## 2024-02-13 DIAGNOSIS — S61.002A UNSPECIFIED OPEN WOUND OF LEFT THUMB WITHOUT DAMAGE TO NAIL, INITIAL ENCOUNTER: ICD-10-CM
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
## 2024-02-15 NOTE — ED ADULT TRIAGE NOTE - CAS DISCH TRANSFER METHOD
Quality 111:Pneumonia Vaccination Status For Older Adults: Pneumococcal vaccine (PPSV23) was not administered on or after patient’s 60th birthday and before the end of the measurement period, reason not otherwise specified
Detail Level: Detailed
Quality 130: Documentation Of Current Medications In The Medical Record: Current Medications Documented
Quality 431: Preventive Care And Screening: Unhealthy Alcohol Use - Screening: Patient not identified as an unhealthy alcohol user when screened for unhealthy alcohol use using a systematic screening method
Quality 226: Preventive Care And Screening: Tobacco Use: Screening And Cessation Intervention: Patient screened for tobacco use and is an ex/non-smoker
Quality 110: Preventive Care And Screening: Influenza Immunization: Influenza Immunization not Administered because Patient Refused.
Walking

## 2024-09-21 ENCOUNTER — EMERGENCY (EMERGENCY)
Facility: HOSPITAL | Age: 54
LOS: 1 days | Discharge: HOME | End: 2024-09-21
Attending: EMERGENCY MEDICINE | Admitting: EMERGENCY MEDICINE
Payer: COMMERCIAL

## 2024-09-21 VITALS
HEIGHT: 63 IN | HEART RATE: 94 BPM | OXYGEN SATURATION: 98 % | DIASTOLIC BLOOD PRESSURE: 92 MMHG | SYSTOLIC BLOOD PRESSURE: 154 MMHG | WEIGHT: 225.09 LBS | RESPIRATION RATE: 18 BRPM | TEMPERATURE: 98 F

## 2024-09-21 LAB
APPEARANCE UR: ABNORMAL
BACTERIA # UR AUTO: ABNORMAL /HPF
BILIRUB UR-MCNC: ABNORMAL
COD CRY URNS QL: SIGNIFICANT CHANGE UP
COLOR SPEC: ABNORMAL
DIFF PNL FLD: ABNORMAL
EPI CELLS # UR: 2 — SIGNIFICANT CHANGE UP
GLUCOSE UR QL: NEGATIVE MG/DL — SIGNIFICANT CHANGE UP
GRAN CASTS # UR COMP ASSIST: SIGNIFICANT CHANGE UP
HYALINE CASTS # UR AUTO: SIGNIFICANT CHANGE UP
KETONES UR-MCNC: NEGATIVE MG/DL — SIGNIFICANT CHANGE UP
LEUKOCYTE ESTERASE UR-ACNC: ABNORMAL
NITRITE UR-MCNC: POSITIVE
PH UR: 5.5 — SIGNIFICANT CHANGE UP (ref 5–8)
PROT UR-MCNC: 300 MG/DL
RBC CASTS # UR COMP ASSIST: >100 /HPF — HIGH (ref 0–4)
SP GR SPEC: 1 — SIGNIFICANT CHANGE UP (ref 1–1.03)
TRI-PHOS CRY UR QL COMP ASSIST: SIGNIFICANT CHANGE UP
URATE CRY FLD QL MICRO: SIGNIFICANT CHANGE UP
UROBILINOGEN FLD QL: 0.2 MG/DL — SIGNIFICANT CHANGE UP (ref 0.2–1)
WBC UR QL: >100 /HPF — HIGH (ref 0–5)

## 2024-09-21 PROCEDURE — 99284 EMERGENCY DEPT VISIT MOD MDM: CPT

## 2024-09-21 RX ORDER — ACETAMINOPHEN 325 MG/1
975 TABLET ORAL ONCE
Refills: 0 | Status: COMPLETED | OUTPATIENT
Start: 2024-09-21 | End: 2024-09-21

## 2024-09-21 RX ORDER — CEFUROXIME SODIUM 1.5 G
1 VIAL (EA) INJECTION
Qty: 14 | Refills: 0
Start: 2024-09-21 | End: 2024-09-27

## 2024-09-21 RX ORDER — FLUCONAZOLE 150 MG/1
1 TABLET ORAL
Qty: 2 | Refills: 0
Start: 2024-09-21 | End: 2024-09-23

## 2024-09-21 RX ORDER — PHENAZOPYRIDINE HCL 100 MG
200 TABLET ORAL ONCE
Refills: 0 | Status: COMPLETED | OUTPATIENT
Start: 2024-09-21 | End: 2024-09-21

## 2024-09-21 RX ORDER — CEFUROXIME SODIUM 1.5 G
500 VIAL (EA) INJECTION ONCE
Refills: 0 | Status: COMPLETED | OUTPATIENT
Start: 2024-09-21 | End: 2024-09-21

## 2024-09-21 RX ORDER — PHENAZOPYRIDINE HCL 100 MG
1 TABLET ORAL
Qty: 6 | Refills: 0
Start: 2024-09-21 | End: 2024-09-23

## 2024-09-21 RX ORDER — IBUPROFEN 600 MG
600 TABLET ORAL ONCE
Refills: 0 | Status: COMPLETED | OUTPATIENT
Start: 2024-09-21 | End: 2024-09-21

## 2024-09-21 RX ORDER — CEFUROXIME SODIUM 1.5 G
250 VIAL (EA) INJECTION ONCE
Refills: 0 | Status: DISCONTINUED | OUTPATIENT
Start: 2024-09-21 | End: 2024-09-21

## 2024-09-21 RX ADMIN — Medication 200 MILLIGRAM(S): at 20:55

## 2024-09-21 RX ADMIN — ACETAMINOPHEN 975 MILLIGRAM(S): 325 TABLET ORAL at 20:56

## 2024-09-21 RX ADMIN — Medication 600 MILLIGRAM(S): at 20:57

## 2024-09-21 RX ADMIN — Medication 500 MILLIGRAM(S): at 21:10

## 2024-09-21 NOTE — ED PROVIDER NOTE - CLINICAL SUMMARY MEDICAL DECISION MAKING FREE TEXT BOX
54-year-old female presenting with UTI symptoms and gross hematuria.  Her symptoms are most consistent with an uncomplicated UTI.  Will send a UA and give p.o. Pyridium, Motrin and Tylenol.  Anticipate discharge on oral antibiotics.  There were no cultures on file available for comparison.  She reports being treated for UTI here in 2020 but our records do not go back that far.

## 2024-09-21 NOTE — ED PROVIDER NOTE - PHYSICAL EXAMINATION
VITAL SIGNS: I have reviewed nursing notes and confirm.  CONST: Well-developed; well-nourished; uncomfortable sitting, not writhing  ENT: No nasal discharge; airway clear.  EYES: Sclera clear. Pupils round and symmetrical bilaterally.  RESP: Breathing comfortably; speaking in full sentences.   ABDO: Soft, non tender  : No CVAT bilaterally   NEURO: Alert, oriented. Speech is fluent and appropriate.  No gross abnormalities.  SKIN: Skin is normal temperature; no diaphoresis; no pallor.  PSYCH: Cooperative. Appropriate mood, language, and behavior.

## 2024-09-21 NOTE — ED PROVIDER NOTE - OBJECTIVE STATEMENT
54-year-old female presenting with gross hematuria [fruit punch colored] as well as burning dysuria and urinary frequency.  She has had UTIs a few times in her adult life.  There is no radiation to the flanks and no associated fevers and chills.  She does not recall ever having to take stronger antibiotics or switch antibiotics.  She has no history of kidney stones.  Symptoms started around 3 PM today.

## 2024-09-21 NOTE — ED PROVIDER NOTE - PATIENT PORTAL LINK FT
You can access the FollowMyHealth Patient Portal offered by [facility] by registering at the following website: [email or web address]. By joining [facility]’s FollowMyHealth portal, you will also be able to view your health information using other applications (apps) compatible with our system.

## 2024-09-23 LAB
CULTURE RESULTS: SIGNIFICANT CHANGE UP
SPECIMEN SOURCE: SIGNIFICANT CHANGE UP

## 2024-09-25 DIAGNOSIS — R30.0 DYSURIA: ICD-10-CM

## 2024-09-25 DIAGNOSIS — N39.0 URINARY TRACT INFECTION, SITE NOT SPECIFIED: ICD-10-CM

## 2025-04-09 NOTE — ED ADULT NURSE NOTE - CAS DISCH TRANSFER METHOD
Read verbatim providers note to patient regarding x-ray. Patient will follow up with ortho.   
Walking